# Patient Record
Sex: FEMALE | Race: BLACK OR AFRICAN AMERICAN | NOT HISPANIC OR LATINO
[De-identification: names, ages, dates, MRNs, and addresses within clinical notes are randomized per-mention and may not be internally consistent; named-entity substitution may affect disease eponyms.]

---

## 2017-08-17 ENCOUNTER — RESULT REVIEW (OUTPATIENT)
Age: 62
End: 2017-08-17

## 2019-10-31 ENCOUNTER — EMERGENCY (EMERGENCY)
Facility: HOSPITAL | Age: 64
LOS: 1 days | Discharge: HOME | End: 2019-10-31
Attending: STUDENT IN AN ORGANIZED HEALTH CARE EDUCATION/TRAINING PROGRAM | Admitting: STUDENT IN AN ORGANIZED HEALTH CARE EDUCATION/TRAINING PROGRAM
Payer: MEDICARE

## 2019-10-31 VITALS
DIASTOLIC BLOOD PRESSURE: 65 MMHG | TEMPERATURE: 98 F | RESPIRATION RATE: 16 BRPM | HEART RATE: 73 BPM | SYSTOLIC BLOOD PRESSURE: 141 MMHG | OXYGEN SATURATION: 99 %

## 2019-10-31 PROCEDURE — 73502 X-RAY EXAM HIP UNI 2-3 VIEWS: CPT | Mod: 26,LT

## 2019-10-31 PROCEDURE — 99283 EMERGENCY DEPT VISIT LOW MDM: CPT

## 2019-10-31 RX ORDER — ACETAMINOPHEN 500 MG
975 TABLET ORAL ONCE
Refills: 0 | Status: COMPLETED | OUTPATIENT
Start: 2019-10-31 | End: 2019-10-31

## 2019-10-31 RX ORDER — IBUPROFEN 200 MG
400 TABLET ORAL ONCE
Refills: 0 | Status: COMPLETED | OUTPATIENT
Start: 2019-10-31 | End: 2019-10-31

## 2019-10-31 RX ADMIN — Medication 400 MILLIGRAM(S): at 11:27

## 2019-10-31 RX ADMIN — Medication 975 MILLIGRAM(S): at 11:27

## 2019-10-31 NOTE — ED PROVIDER NOTE - PHYSICAL EXAMINATION
Msk: Limited ROM at L hip secondary to pain, L knee with full ROM , 5.5 strength of LLE, equal DP pulses, no swelling or external signs of trauma. Point tenderness to outer L hip. Msk: Limited ROM at L hip secondary to pain, L knee with full ROM , 5/5 strength of LLE, equal DP pulses, no swelling or external signs of trauma. Point tenderness to outer L hip.

## 2019-10-31 NOTE — ED PROVIDER NOTE - PATIENT PORTAL LINK FT
You can access the FollowMyHealth Patient Portal offered by [facility] by registering at the following website: [email or web address]. By joining [employer]’s FollowMyHealth portal, you will also be able to view your health information using other applications (apps) compatible with our system.

## 2019-10-31 NOTE — ED PROVIDER NOTE - CLINICAL SUMMARY MEDICAL DECISION MAKING FREE TEXT BOX
65 y/o F presents to ED with L hip and L leg pain after exercising progressively worsening. On physical exam pt has point tenderness to outer L hip. Plan for pain control, x-ray, and reassess.

## 2019-10-31 NOTE — ED PROVIDER NOTE - PROGRESS NOTE DETAILS
Pt is feeling better.  xrays with no acute findings.  will discharge with ortho follow up and insturctions for pain control at home.  - Ileana Obrien, DO

## 2019-10-31 NOTE — ED PROVIDER NOTE - OBJECTIVE STATEMENT
63 y/o F with PMHx HTN and HLD presents to ED with worsening sharp L leg pain since a week. Pt states at the onset of pain was exercising. Pain begins in L hip/buttocks and radiates down leg. Associated with these symptoms pt c/o numbness and difficulty bearing weight secondary to pain. Denies having any tingling, fever, recent falls/trauma, or other medical complaints. Advil taken with minimal relief of symptoms.

## 2019-10-31 NOTE — ED PROVIDER NOTE - NSFOLLOWUPINSTRUCTIONS_ED_ALL_ED_FT
- Take Motrin 400mg every 6 hrs as needed for pain. Take with food   - Take Tylenol 650mg every 6 hrs as needed for pain.  - You may purchase Lidocaine patches over the counter (Brand name is Salonpas) for pain relief.  Apply to 12 hours and remove for 12 hours.    - Follow up with your primary care physician within 2-3days for reevaluation.  - I have also provided numbers for orthopedic follow up - please call for an appointment.  - Return to the Emergency Department for worsening, progressive or any other concerning symptoms.

## 2019-10-31 NOTE — ED ADULT TRIAGE NOTE - CHIEF COMPLAINT QUOTE
Pt complaining of L pain radiating down her leg since Friday. Pt states it became worse over the past day. Pt denies chest pain, sob, n/v/d, fever or chills.

## 2022-01-27 PROBLEM — E78.5 HYPERLIPIDEMIA, UNSPECIFIED: Chronic | Status: ACTIVE | Noted: 2019-10-31

## 2022-01-27 PROBLEM — I10 ESSENTIAL (PRIMARY) HYPERTENSION: Chronic | Status: ACTIVE | Noted: 2019-10-31

## 2022-01-31 ENCOUNTER — APPOINTMENT (OUTPATIENT)
Dept: SURGERY | Facility: CLINIC | Age: 67
End: 2022-01-31
Payer: MEDICARE

## 2022-01-31 VITALS
SYSTOLIC BLOOD PRESSURE: 113 MMHG | HEIGHT: 65 IN | HEART RATE: 62 BPM | DIASTOLIC BLOOD PRESSURE: 66 MMHG | BODY MASS INDEX: 27.16 KG/M2 | WEIGHT: 163 LBS

## 2022-01-31 VITALS — TEMPERATURE: 96.9 F

## 2022-01-31 DIAGNOSIS — E07.9 DISORDER OF THYROID, UNSPECIFIED: ICD-10-CM

## 2022-01-31 DIAGNOSIS — I10 ESSENTIAL (PRIMARY) HYPERTENSION: ICD-10-CM

## 2022-01-31 DIAGNOSIS — Z80.3 FAMILY HISTORY OF MALIGNANT NEOPLASM OF BREAST: ICD-10-CM

## 2022-01-31 DIAGNOSIS — Z78.9 OTHER SPECIFIED HEALTH STATUS: ICD-10-CM

## 2022-01-31 DIAGNOSIS — Z63.4 DISAPPEARANCE AND DEATH OF FAMILY MEMBER: ICD-10-CM

## 2022-01-31 DIAGNOSIS — E78.00 PURE HYPERCHOLESTEROLEMIA, UNSPECIFIED: ICD-10-CM

## 2022-01-31 PROCEDURE — 99203 OFFICE O/P NEW LOW 30 MIN: CPT

## 2022-01-31 RX ORDER — SERTRALINE 25 MG/1
25 TABLET, FILM COATED ORAL
Qty: 90 | Refills: 0 | Status: ACTIVE | COMMUNITY
Start: 2021-08-11

## 2022-01-31 RX ORDER — LOSARTAN POTASSIUM 50 MG/1
50 TABLET, FILM COATED ORAL
Qty: 90 | Refills: 0 | Status: ACTIVE | COMMUNITY
Start: 2021-11-13

## 2022-01-31 RX ORDER — SIMVASTATIN 20 MG/1
20 TABLET, FILM COATED ORAL
Qty: 90 | Refills: 0 | Status: ACTIVE | COMMUNITY
Start: 2021-08-11

## 2022-01-31 RX ORDER — AMLODIPINE BESYLATE 10 MG/1
10 TABLET ORAL
Qty: 90 | Refills: 0 | Status: ACTIVE | COMMUNITY
Start: 2021-08-11

## 2022-01-31 SDOH — SOCIAL STABILITY - SOCIAL INSECURITY: DISSAPEARANCE AND DEATH OF FAMILY MEMBER: Z63.4

## 2022-01-31 NOTE — CONSULT LETTER
[Dear  ___] : Dear  [unfilled], [Consult Letter:] : I had the pleasure of evaluating your patient, [unfilled]. [Please see my note below.] : Please see my note below. [Consult Closing:] : Thank you very much for allowing me to participate in the care of this patient.  If you have any questions, please do not hesitate to contact me. [Sincerely,] : Sincerely, [FreeTextEntry3] : Aly Ribeiro MD, FACS

## 2022-01-31 NOTE — HISTORY OF PRESENT ILLNESS
[de-identified] : Patient is a 66 year -old female  who was referred by Dr. Elisha Lester with the chief complaint of having a Left   breast mass . She  denies any trauma and She has no nipple discharge. She  denies any fever, night sweats or loss of appetite.    There is  family history of breast carcinoma in her sister's son.    Menarche at age 12 -3 para-2 and her last menstrual period was when she was 55 yo. Patient is on no hormonal replacement therapy.   Patient  had a  BL mammogram and US  on . Mammo  was deemed a BIRADS 0 and US deemed  BIRADS4  and showed Asymmetry in the left breast .   she had a US guided left breast (x2)  on 2022. 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 ER/OK negative. HER2 is equivocal. patient reports excision of the right breast mass in . it was benign.

## 2022-01-31 NOTE — PHYSICAL EXAM
[Alert] : alert [Oriented to Person] : oriented to person [Oriented to Place] : oriented to place [Oriented to Time] : oriented to time [Anxious] : anxious [de-identified] : She  is alert, well-groomed, and in NAD\par   [de-identified] : anicteric.  Nasal mucosa pink, septum midline. Oral mucosa pink.  Tongue midline, Pharynx without exudates.\par   [de-identified] : Neck supple. Trachea midline. Thyroid isthmus barely palpable, lobes not felt.\par   [de-identified] :  symmetric breasts with no nipple or skin retraction.  right breast periareolar scar. On palpation of her breasts, there is a palpable mass in the left 1200 6 cm FN.      No palpable masses on the right. No palpable lymph nodes.  No palpable supraclavicular masses. Axillas are benign/have palpable nodes.

## 2022-01-31 NOTE — PLAN
[FreeTextEntry1] : Ms. SMITH  was told significance of findings, options, risks and benefits were explained.  All surgical options were discussed including non-surgical treatments.   patient has multicentric left breast CA and will most likely a left breast mastectomy.  she was advised to have MRI of the breast prior to preceding with surgical options and return after the test. \par Patient advised to seek immediate medical attention with any acute change in symptoms or with the development of any new or worsening symptoms.  Patient's questions and concerns addressed to patient's satisfaction, and patient verbalized an understanding of the information discussed.\par \par

## 2022-02-24 ENCOUNTER — APPOINTMENT (OUTPATIENT)
Dept: SURGERY | Facility: CLINIC | Age: 67
End: 2022-02-24
Payer: MEDICARE

## 2022-02-24 VITALS — TEMPERATURE: 98 F

## 2022-02-24 PROCEDURE — 99213 OFFICE O/P EST LOW 20 MIN: CPT

## 2022-02-24 NOTE — PHYSICAL EXAM
[Alert] : alert [Oriented to Person] : oriented to person [Oriented to Place] : oriented to place [Oriented to Time] : oriented to time [Anxious] : anxious [de-identified] : She  is alert, well-groomed, and in NAD\par   [de-identified] : anicteric.  Nasal mucosa pink, septum midline. Oral mucosa pink.  Tongue midline, Pharynx without exudates.\par   [de-identified] : Neck supple. Trachea midline. Thyroid isthmus barely palpable, lobes not felt.\par   [de-identified] :  symmetric breasts with no nipple or skin retraction.  right breast periareolar scar. On palpation of her breasts, there is a palpable mass in the left 1200 6 cm FN.      No palpable masses on the right. No palpable lymph nodes.  No palpable supraclavicular masses. Axillas are benign/have palpable nodes.

## 2022-02-24 NOTE — HISTORY OF PRESENT ILLNESS
[de-identified] : Patient is a 66 year -old female  who was referred by Dr. Elisha Lester with the chief complaint of having a Left   breast mass .  she was advised to have BL breast MRI. she is returning today to discuss the results. there has been a technical issue with the computer system  at [facility] as per their representative. Ms. SMITH denies any trauma and she has no nipple discharge. Patient denies any fever, night sweats or loss of appetite.  she is here today  asking for BL mastectomy. she does not want reconstruction. \par Pertinent History:  \par  There is  family history of breast carcinoma in her sister's son.    Menarche at age 12 -3 para-2 and her last menstrual period was when she was 57 yo. Patient is on no hormonal replacement therapy.   Patient  had a  BL mammogram and US  on . Mammo  was deemed a BIRADS 0 and US deemed  BIRADS4  and showed Asymmetry in the left breast .   she had a US guided left breast (x2)  on 2022. 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 ER/UT negative. HER2 is equivocal. patient reports excision of the right breast mass in . it was benign.

## 2022-02-24 NOTE — PLAN
[FreeTextEntry1] : Ms. SMITH  was told significance of findings, options, risks and benefits were explained.  Informed consent for modified radical mastectomy left breast with sentinel  lymph node biopsy and right simple mastectomy and potential risks, benefits and alternatives (surgical options were discussed including non-surgical options or the option of no surgery) to the planned surgery were discussed in depth.  All surgical options were discussed including non-surgical treatments.  She wishes to proceed with surgery.  We will plan for surgery on at the next available date, pending any required insurance pre-certification or pre-approval. She agrees to obtain any necessary pre-operative evaluations and testing prior to surgery.\par Patient advised to seek immediate medical attention with any acute change in symptoms or with the development of any new or worsening symptoms.  Patient's questions and concerns addressed to patient's satisfaction, and patient verbalized an understanding of the information discussed.\par \par

## 2022-03-03 ENCOUNTER — OUTPATIENT (OUTPATIENT)
Dept: OUTPATIENT SERVICES | Facility: HOSPITAL | Age: 67
LOS: 1 days | End: 2022-03-03
Payer: MEDICARE

## 2022-03-03 VITALS
SYSTOLIC BLOOD PRESSURE: 132 MMHG | DIASTOLIC BLOOD PRESSURE: 70 MMHG | OXYGEN SATURATION: 99 % | RESPIRATION RATE: 16 BRPM | HEART RATE: 62 BPM | HEIGHT: 65 IN | TEMPERATURE: 98 F | WEIGHT: 154.98 LBS

## 2022-03-03 DIAGNOSIS — I10 ESSENTIAL (PRIMARY) HYPERTENSION: ICD-10-CM

## 2022-03-03 DIAGNOSIS — Z98.891 HISTORY OF UTERINE SCAR FROM PREVIOUS SURGERY: Chronic | ICD-10-CM

## 2022-03-03 DIAGNOSIS — C50.912 MALIGNANT NEOPLASM OF UNSPECIFIED SITE OF LEFT FEMALE BREAST: ICD-10-CM

## 2022-03-03 DIAGNOSIS — G47.33 OBSTRUCTIVE SLEEP APNEA (ADULT) (PEDIATRIC): ICD-10-CM

## 2022-03-03 DIAGNOSIS — Z01.818 ENCOUNTER FOR OTHER PREPROCEDURAL EXAMINATION: ICD-10-CM

## 2022-03-03 DIAGNOSIS — Z98.890 OTHER SPECIFIED POSTPROCEDURAL STATES: Chronic | ICD-10-CM

## 2022-03-03 LAB
ALBUMIN SERPL ELPH-MCNC: 4.2 G/DL — SIGNIFICANT CHANGE UP (ref 3.5–5)
ALP SERPL-CCNC: 81 U/L — SIGNIFICANT CHANGE UP (ref 40–120)
ALT FLD-CCNC: 22 U/L DA — SIGNIFICANT CHANGE UP (ref 10–60)
ANION GAP SERPL CALC-SCNC: 1 MMOL/L — LOW (ref 5–17)
APTT BLD: 36.8 SEC — HIGH (ref 27.5–35.5)
AST SERPL-CCNC: 22 U/L — SIGNIFICANT CHANGE UP (ref 10–40)
BASOPHILS # BLD AUTO: 0.03 K/UL — SIGNIFICANT CHANGE UP (ref 0–0.2)
BASOPHILS NFR BLD AUTO: 0.5 % — SIGNIFICANT CHANGE UP (ref 0–2)
BILIRUB SERPL-MCNC: 0.3 MG/DL — SIGNIFICANT CHANGE UP (ref 0.2–1.2)
BLD GP AB SCN SERPL QL: SIGNIFICANT CHANGE UP
BUN SERPL-MCNC: 17 MG/DL — SIGNIFICANT CHANGE UP (ref 7–18)
CALCIUM SERPL-MCNC: 10 MG/DL — SIGNIFICANT CHANGE UP (ref 8.4–10.5)
CHLORIDE SERPL-SCNC: 110 MMOL/L — HIGH (ref 96–108)
CO2 SERPL-SCNC: 27 MMOL/L — SIGNIFICANT CHANGE UP (ref 22–31)
CREAT SERPL-MCNC: 1.03 MG/DL — SIGNIFICANT CHANGE UP (ref 0.5–1.3)
EGFR: 59 ML/MIN/1.73M2 — LOW
EOSINOPHIL # BLD AUTO: 0.07 K/UL — SIGNIFICANT CHANGE UP (ref 0–0.5)
EOSINOPHIL NFR BLD AUTO: 1.3 % — SIGNIFICANT CHANGE UP (ref 0–6)
GLUCOSE SERPL-MCNC: 105 MG/DL — HIGH (ref 70–99)
HCT VFR BLD CALC: 37.5 % — SIGNIFICANT CHANGE UP (ref 34.5–45)
HGB BLD-MCNC: 12.1 G/DL — SIGNIFICANT CHANGE UP (ref 11.5–15.5)
IMM GRANULOCYTES NFR BLD AUTO: 0.2 % — SIGNIFICANT CHANGE UP (ref 0–1.5)
INR BLD: 1.01 RATIO — SIGNIFICANT CHANGE UP (ref 0.88–1.16)
LYMPHOCYTES # BLD AUTO: 3.36 K/UL — HIGH (ref 1–3.3)
LYMPHOCYTES # BLD AUTO: 60 % — HIGH (ref 13–44)
MCHC RBC-ENTMCNC: 29.4 PG — SIGNIFICANT CHANGE UP (ref 27–34)
MCHC RBC-ENTMCNC: 32.3 GM/DL — SIGNIFICANT CHANGE UP (ref 32–36)
MCV RBC AUTO: 91.2 FL — SIGNIFICANT CHANGE UP (ref 80–100)
MONOCYTES # BLD AUTO: 0.44 K/UL — SIGNIFICANT CHANGE UP (ref 0–0.9)
MONOCYTES NFR BLD AUTO: 7.9 % — SIGNIFICANT CHANGE UP (ref 2–14)
NEUTROPHILS # BLD AUTO: 1.69 K/UL — LOW (ref 1.8–7.4)
NEUTROPHILS NFR BLD AUTO: 30.1 % — LOW (ref 43–77)
NRBC # BLD: 0 /100 WBCS — SIGNIFICANT CHANGE UP (ref 0–0)
PLATELET # BLD AUTO: 276 K/UL — SIGNIFICANT CHANGE UP (ref 150–400)
POTASSIUM SERPL-MCNC: 4.5 MMOL/L — SIGNIFICANT CHANGE UP (ref 3.5–5.3)
POTASSIUM SERPL-SCNC: 4.5 MMOL/L — SIGNIFICANT CHANGE UP (ref 3.5–5.3)
PROT SERPL-MCNC: 7.7 G/DL — SIGNIFICANT CHANGE UP (ref 6–8.3)
PROTHROM AB SERPL-ACNC: 12 SEC — SIGNIFICANT CHANGE UP (ref 10.5–13.4)
RBC # BLD: 4.11 M/UL — SIGNIFICANT CHANGE UP (ref 3.8–5.2)
RBC # FLD: 12.3 % — SIGNIFICANT CHANGE UP (ref 10.3–14.5)
SODIUM SERPL-SCNC: 138 MMOL/L — SIGNIFICANT CHANGE UP (ref 135–145)
WBC # BLD: 5.6 K/UL — SIGNIFICANT CHANGE UP (ref 3.8–10.5)
WBC # FLD AUTO: 5.6 K/UL — SIGNIFICANT CHANGE UP (ref 3.8–10.5)

## 2022-03-03 PROCEDURE — 93005 ELECTROCARDIOGRAM TRACING: CPT

## 2022-03-03 PROCEDURE — 71046 X-RAY EXAM CHEST 2 VIEWS: CPT

## 2022-03-03 PROCEDURE — 93010 ELECTROCARDIOGRAM REPORT: CPT

## 2022-03-03 PROCEDURE — 71046 X-RAY EXAM CHEST 2 VIEWS: CPT | Mod: 26

## 2022-03-03 PROCEDURE — G0463: CPT

## 2022-03-03 NOTE — H&P PST ADULT - PROBLEM SELECTOR PLAN 2
Current treatment regimen - Amlodipine 10mg and Losartan 50mg daily. Advised to c/w regimen  Patient instructed with understanding verbalized to take Amlodipine and Losartan with a sip of water the day of surgery.  Follow up with PCP postoperatively.

## 2022-03-03 NOTE — H&P PST ADULT - PROBLEM SELECTOR PLAN 3
Patient with hx of RYAN on CPAP (non complaint). Patient states "I have it somewhere in my house, may be in the attic. I don't think I have it anymore"  Recommend maintaining perioperative RYAN precautions

## 2022-03-03 NOTE — H&P PST ADULT - PROBLEM SELECTOR PLAN 1
Patient scheduled for left breast modified radical mastectomy with sentinel lymph node biopsy and right breast mastectomy on 3/9/2022  Written and oral preoperative instructions given to patient with understanding verbalized.   Instructions given to include using 4% chlorhexidine gluconate pre procedural wash day of surgery prior to hospital arrival  Maintaining NPO status post midnight day before surgery  Stopping aspirin, NSAIDs, herbs, vitamins 7days before surgery   Patient is to expect a phone call day before surgery between the hours of 430- 630pm giving arrival time for surgery day.    Preoperative labs drawn (CBC, CMP, PT, PTT, INR, TYPE/SCREEN). Results pending   CXR ordered to be done today. Results pending   EKG done today - sinus bradycardia with 1st degree AV block, otherwise normal ECG. Copy given to patient for PMD's medical preoperative optimization

## 2022-03-03 NOTE — H&P PST ADULT - HISTORY OF PRESENT ILLNESS
67year old female with pmhx of RYAN on CPAP (non compliant), hypertension, hyperlipidemia and depression presents with c/o abnormal mammogram revealing lump confirmed by biopsy to be malignant. Patient is here today for presurgical testing for scheduled left breast modified radical mastectomy with sentinel lymph node biopsy and right breast mastectomy on 3/9/2022

## 2022-03-03 NOTE — H&P PST ADULT - NEGATIVE ENMT SYMPTOMS
no hearing difficulty/no nasal congestion/no nasal discharge/no dry mouth/no throat pain/no dysphagia

## 2022-03-03 NOTE — H&P PST ADULT - RESPIRATORY AND THORAX COMMENTS
Hx RYAN on CPAP (non compliant) states "I have it somewhere in my house, I don't think I have it anymore"

## 2022-03-03 NOTE — H&P PST ADULT - NSICDXPASTMEDICALHX_GEN_ALL_CORE_FT
PAST MEDICAL HISTORY:  Depression, major     HLD (hyperlipidemia)     HTN (hypertension)     RYAN on CPAP Non compliant

## 2022-03-08 ENCOUNTER — TRANSCRIPTION ENCOUNTER (OUTPATIENT)
Age: 67
End: 2022-03-08

## 2022-03-09 ENCOUNTER — INPATIENT (INPATIENT)
Facility: HOSPITAL | Age: 67
LOS: 0 days | Discharge: HOME HEALTH | DRG: 581 | End: 2022-03-10
Attending: SPECIALIST | Admitting: SPECIALIST
Payer: MEDICARE

## 2022-03-09 ENCOUNTER — RESULT REVIEW (OUTPATIENT)
Age: 67
End: 2022-03-09

## 2022-03-09 ENCOUNTER — APPOINTMENT (OUTPATIENT)
Dept: SURGERY | Facility: HOSPITAL | Age: 67
End: 2022-03-09
Payer: MEDICARE

## 2022-03-09 VITALS
RESPIRATION RATE: 16 BRPM | DIASTOLIC BLOOD PRESSURE: 59 MMHG | WEIGHT: 154.98 LBS | HEIGHT: 65 IN | SYSTOLIC BLOOD PRESSURE: 123 MMHG | HEART RATE: 73 BPM | TEMPERATURE: 99 F | OXYGEN SATURATION: 99 %

## 2022-03-09 DIAGNOSIS — Z01.818 ENCOUNTER FOR OTHER PREPROCEDURAL EXAMINATION: ICD-10-CM

## 2022-03-09 DIAGNOSIS — C50.912 MALIGNANT NEOPLASM OF UNSPECIFIED SITE OF LEFT FEMALE BREAST: ICD-10-CM

## 2022-03-09 DIAGNOSIS — Z98.891 HISTORY OF UTERINE SCAR FROM PREVIOUS SURGERY: Chronic | ICD-10-CM

## 2022-03-09 DIAGNOSIS — Z98.890 OTHER SPECIFIED POSTPROCEDURAL STATES: Chronic | ICD-10-CM

## 2022-03-09 LAB — BLD GP AB SCN SERPL QL: SIGNIFICANT CHANGE UP

## 2022-03-09 PROCEDURE — 19307 MAST MOD RAD: CPT | Mod: LT

## 2022-03-09 PROCEDURE — 88309 TISSUE EXAM BY PATHOLOGIST: CPT | Mod: 26

## 2022-03-09 PROCEDURE — 78195 LYMPH SYSTEM IMAGING: CPT | Mod: 26,MG

## 2022-03-09 PROCEDURE — 19303 MAST SIMPLE COMPLETE: CPT | Mod: RT

## 2022-03-09 PROCEDURE — 88333 PATH CONSLTJ SURG CYTO XM 1: CPT | Mod: 26

## 2022-03-09 PROCEDURE — 88342 IMHCHEM/IMCYTCHM 1ST ANTB: CPT | Mod: 26

## 2022-03-09 PROCEDURE — G1004: CPT

## 2022-03-09 PROCEDURE — 88305 TISSUE EXAM BY PATHOLOGIST: CPT | Mod: 26

## 2022-03-09 RX ORDER — AMLODIPINE BESYLATE 2.5 MG/1
10 TABLET ORAL DAILY
Refills: 0 | Status: DISCONTINUED | OUTPATIENT
Start: 2022-03-09 | End: 2022-03-10

## 2022-03-09 RX ORDER — LOSARTAN POTASSIUM 100 MG/1
1 TABLET, FILM COATED ORAL
Qty: 0 | Refills: 0 | DISCHARGE

## 2022-03-09 RX ORDER — SERTRALINE 25 MG/1
1 TABLET, FILM COATED ORAL
Qty: 0 | Refills: 0 | DISCHARGE

## 2022-03-09 RX ORDER — MORPHINE SULFATE 50 MG/1
2 CAPSULE, EXTENDED RELEASE ORAL EVERY 4 HOURS
Refills: 0 | Status: DISCONTINUED | OUTPATIENT
Start: 2022-03-09 | End: 2022-03-10

## 2022-03-09 RX ORDER — SIMVASTATIN 20 MG/1
20 TABLET, FILM COATED ORAL AT BEDTIME
Refills: 0 | Status: DISCONTINUED | OUTPATIENT
Start: 2022-03-09 | End: 2022-03-10

## 2022-03-09 RX ORDER — LOSARTAN POTASSIUM 100 MG/1
50 TABLET, FILM COATED ORAL DAILY
Refills: 0 | Status: DISCONTINUED | OUTPATIENT
Start: 2022-03-09 | End: 2022-03-10

## 2022-03-09 RX ORDER — HEPARIN SODIUM 5000 [USP'U]/ML
5000 INJECTION INTRAVENOUS; SUBCUTANEOUS EVERY 8 HOURS
Refills: 0 | Status: DISCONTINUED | OUTPATIENT
Start: 2022-03-10 | End: 2022-03-10

## 2022-03-09 RX ORDER — AMLODIPINE BESYLATE 2.5 MG/1
1 TABLET ORAL
Qty: 0 | Refills: 0 | DISCHARGE

## 2022-03-09 RX ORDER — FENTANYL CITRATE 50 UG/ML
25 INJECTION INTRAVENOUS
Refills: 0 | Status: DISCONTINUED | OUTPATIENT
Start: 2022-03-09 | End: 2022-03-09

## 2022-03-09 RX ORDER — SODIUM CHLORIDE 9 MG/ML
3 INJECTION INTRAMUSCULAR; INTRAVENOUS; SUBCUTANEOUS EVERY 8 HOURS
Refills: 0 | Status: DISCONTINUED | OUTPATIENT
Start: 2022-03-09 | End: 2022-03-09

## 2022-03-09 RX ORDER — SIMVASTATIN 20 MG/1
1 TABLET, FILM COATED ORAL
Qty: 0 | Refills: 0 | DISCHARGE

## 2022-03-09 RX ORDER — SERTRALINE 25 MG/1
25 TABLET, FILM COATED ORAL DAILY
Refills: 0 | Status: DISCONTINUED | OUTPATIENT
Start: 2022-03-09 | End: 2022-03-10

## 2022-03-09 RX ADMIN — SIMVASTATIN 20 MILLIGRAM(S): 20 TABLET, FILM COATED ORAL at 21:23

## 2022-03-09 NOTE — ASU PATIENT PROFILE, ADULT - FALL HARM RISK - UNIVERSAL INTERVENTIONS
Bed in lowest position, wheels locked, appropriate side rails in place/Call bell, personal items and telephone in reach/Instruct patient to call for assistance before getting out of bed or chair/Non-slip footwear when patient is out of bed/[place] to call system/Physically safe environment - no spills, clutter or unnecessary equipment/Purposeful Proactive Rounding/Room/bathroom lighting operational, light cord in reach

## 2022-03-09 NOTE — BRIEF OPERATIVE NOTE - NSICDXBRIEFPROCEDURE_GEN_ALL_CORE_FT
PROCEDURES:  Right simple mastectomy 09-Mar-2022 11:46:44  Brooks Barboza  Modified radical mastectomy of left breast with sentinel node biopsy 09-Mar-2022 11:47:09  Brooks Barboza

## 2022-03-10 ENCOUNTER — TRANSCRIPTION ENCOUNTER (OUTPATIENT)
Age: 67
End: 2022-03-10

## 2022-03-10 VITALS
SYSTOLIC BLOOD PRESSURE: 111 MMHG | OXYGEN SATURATION: 98 % | DIASTOLIC BLOOD PRESSURE: 54 MMHG | RESPIRATION RATE: 18 BRPM | HEART RATE: 987 BPM

## 2022-03-10 LAB
ANION GAP SERPL CALC-SCNC: 2 MMOL/L — LOW (ref 5–17)
BUN SERPL-MCNC: 14 MG/DL — SIGNIFICANT CHANGE UP (ref 7–18)
CALCIUM SERPL-MCNC: 9.9 MG/DL — SIGNIFICANT CHANGE UP (ref 8.4–10.5)
CHLORIDE SERPL-SCNC: 107 MMOL/L — SIGNIFICANT CHANGE UP (ref 96–108)
CO2 SERPL-SCNC: 28 MMOL/L — SIGNIFICANT CHANGE UP (ref 22–31)
CREAT SERPL-MCNC: 0.91 MG/DL — SIGNIFICANT CHANGE UP (ref 0.5–1.3)
EGFR: 68 ML/MIN/1.73M2 — SIGNIFICANT CHANGE UP
GLUCOSE SERPL-MCNC: 109 MG/DL — HIGH (ref 70–99)
HCT VFR BLD CALC: 32 % — LOW (ref 34.5–45)
HGB BLD-MCNC: 10.9 G/DL — LOW (ref 11.5–15.5)
MCHC RBC-ENTMCNC: 30.5 PG — SIGNIFICANT CHANGE UP (ref 27–34)
MCHC RBC-ENTMCNC: 34.1 GM/DL — SIGNIFICANT CHANGE UP (ref 32–36)
MCV RBC AUTO: 89.6 FL — SIGNIFICANT CHANGE UP (ref 80–100)
NRBC # BLD: 0 /100 WBCS — SIGNIFICANT CHANGE UP (ref 0–0)
PLATELET # BLD AUTO: 211 K/UL — SIGNIFICANT CHANGE UP (ref 150–400)
POTASSIUM SERPL-MCNC: 4.1 MMOL/L — SIGNIFICANT CHANGE UP (ref 3.5–5.3)
POTASSIUM SERPL-SCNC: 4.1 MMOL/L — SIGNIFICANT CHANGE UP (ref 3.5–5.3)
RBC # BLD: 3.57 M/UL — LOW (ref 3.8–5.2)
RBC # FLD: 12.4 % — SIGNIFICANT CHANGE UP (ref 10.3–14.5)
SODIUM SERPL-SCNC: 137 MMOL/L — SIGNIFICANT CHANGE UP (ref 135–145)
WBC # BLD: 9.48 K/UL — SIGNIFICANT CHANGE UP (ref 3.8–10.5)
WBC # FLD AUTO: 9.48 K/UL — SIGNIFICANT CHANGE UP (ref 3.8–10.5)

## 2022-03-10 PROCEDURE — 36415 COLL VENOUS BLD VENIPUNCTURE: CPT

## 2022-03-10 PROCEDURE — 78195 LYMPH SYSTEM IMAGING: CPT | Mod: MG

## 2022-03-10 PROCEDURE — 86850 RBC ANTIBODY SCREEN: CPT

## 2022-03-10 PROCEDURE — 88342 IMHCHEM/IMCYTCHM 1ST ANTB: CPT

## 2022-03-10 PROCEDURE — 80048 BASIC METABOLIC PNL TOTAL CA: CPT

## 2022-03-10 PROCEDURE — A9541: CPT

## 2022-03-10 PROCEDURE — G1004: CPT

## 2022-03-10 PROCEDURE — 85027 COMPLETE CBC AUTOMATED: CPT

## 2022-03-10 PROCEDURE — 86901 BLOOD TYPING SEROLOGIC RH(D): CPT

## 2022-03-10 PROCEDURE — 88305 TISSUE EXAM BY PATHOLOGIST: CPT

## 2022-03-10 PROCEDURE — 86923 COMPATIBILITY TEST ELECTRIC: CPT

## 2022-03-10 PROCEDURE — 88333 PATH CONSLTJ SURG CYTO XM 1: CPT

## 2022-03-10 PROCEDURE — 86900 BLOOD TYPING SEROLOGIC ABO: CPT

## 2022-03-10 PROCEDURE — 88309 TISSUE EXAM BY PATHOLOGIST: CPT

## 2022-03-10 RX ORDER — OXYCODONE HYDROCHLORIDE 5 MG/1
1 TABLET ORAL
Qty: 6 | Refills: 0
Start: 2022-03-10

## 2022-03-10 RX ADMIN — HEPARIN SODIUM 5000 UNIT(S): 5000 INJECTION INTRAVENOUS; SUBCUTANEOUS at 06:11

## 2022-03-10 RX ADMIN — SERTRALINE 25 MILLIGRAM(S): 25 TABLET, FILM COATED ORAL at 12:48

## 2022-03-10 NOTE — PROGRESS NOTE ADULT - SUBJECTIVE AND OBJECTIVE BOX
Post op  Pt s- complaints of pain, no cp no sob  ICU Vital Signs Last 24 Hrs  T(C): 36.5 (09 Mar 2022 15:21), Max: 37 (09 Mar 2022 08:43)  T(F): 97.7 (09 Mar 2022 15:21), Max: 98.6 (09 Mar 2022 08:43)  HR: 69 (09 Mar 2022 15:21) (69 - 88)  BP: 118/64 (09 Mar 2022 15:21) (113/53 - 124/55)  BP(mean): 70 (09 Mar 2022 14:34) (67 - 88)  ABP: --  ABP(mean): --  RR: 18 (09 Mar 2022 15:21) (13 - 19)  SpO2: 100% (09 Mar 2022 15:21) (94% - 100%)  Alert nad  Abd: soft nt nd chest wall wound: minimal dried blood on dressign laterally. no hematome, no active bleed evident.  JPRight 20cc serosanguinous  RYAN Left 1 25cc serosanguinous   RYAN 2 5cc serosanguinous  no calf swelling or erythema  
INTERVAL HPI/OVERNIGHT EVENTS:  Pt stable.   Tolerating diet.   Minimal incisional pain.    Vital Signs Last 24 Hrs  T(C): 36.9 (10 Mar 2022 06:02), Max: 37.3 (10 Mar 2022 00:00)  T(F): 98.5 (10 Mar 2022 06:02), Max: 99.1 (10 Mar 2022 00:00)  HR: 67 (10 Mar 2022 06:02) (67 - 88)  BP: 114/64 (10 Mar 2022 06:02) (113/53 - 125/57)  BP(mean): 71 (09 Mar 2022 20:23) (67 - 88)  RR: 17 (10 Mar 2022 06:02) (13 - 19)  SpO2: 98% (10 Mar 2022 06:02) (94% - 100%)    Physical:  Dressings intact, German drains serosanguinous drainage.  Abdomen: Soft nondistended, nontender.    I&O's Summary    09 Mar 2022 07:01  -  10 Mar 2022 07:00  --------------------------------------------------------  IN: 950 mL / OUT: 472 mL / NET: 478 mL        LABS:                        10.9   9.48  )-----------( 211      ( 10 Mar 2022 05:44 )             32.0             03-10    137  |  107  |  14  ----------------------------<  109<H>  4.1   |  28  |  0.91    Ca    9.9      10 Mar 2022 05:44    
S/p bilateral mastectomy, left sentinel lymph node POD#1  Patient seen and examined at bedside   Admits to mild pain.  Denies nausea and vomiting  tolerating diet    Vital Signs Last 24 Hrs  T(F): 98.5 (03-10-22 @ 06:02), Max: 99.1 (03-10-22 @ 00:00)  HR: 67 (03-10-22 @ 06:02)  BP: 114/64 (03-10-22 @ 06:02)  RR: 17 (03-10-22 @ 06:02)  SpO2: 98% (03-10-22 @ 06:02)    GENERAL: Alert, NAD  CHEST/LUNG: respirations nonlabored; postoperative bra in place, some blood staining to lateral dressing under bra near drain site. Drains x3. right drain: 70ml output since placed, left drain output: 65ml and 37ml since placed     I&O's Detail    09 Mar 2022 07:01  -  10 Mar 2022 07:00  --------------------------------------------------------  IN:    Lactated Ringers Bolus: 950 mL  Total IN: 950 mL    OUT:    Bulb (mL): 37 mL    Bulb (mL): 65 mL    Bulb (mL): 70 mL    Voided (mL): 300 mL  Total OUT: 472 mL    Total NET: 478 mL    LABS:                        10.9   9.48  )-----------( 211      ( 10 Mar 2022 05:44 )             32.0     03-10    137  |  107  |  14  ----------------------------<  109<H>  4.1   |  28  |  0.91    Ca    9.9      10 Mar 2022 05:44

## 2022-03-10 NOTE — DISCHARGE NOTE NURSING/CASE MANAGEMENT/SOCIAL WORK - PATIENT PORTAL LINK FT
You can access the FollowMyHealth Patient Portal offered by [facility] by registering at the following website: [email or web address]. By joining [employer]’s FollowMyHealth portal, you will also be able to view your health information using other applications (apps) compatible with our system.

## 2022-03-10 NOTE — PROGRESS NOTE ADULT - ASSESSMENT
67 year old female S/p bilateral mastectomy, left sentinel lymph node POD#1, labs and vitals stable    - drain teaching prior to discharge  - regular diet  - discharge planning to home  - follow up with Dr. Ribeiro in one week for drain removal  - discussed with Dr. Ribeiro   
D/C planning.
s/p Right simple mastectemy  s/p left MRM and sentinal LN bx  post op stable    oob  diet as tolerated  analgesia prn  d/c planning in am

## 2022-03-10 NOTE — DISCHARGE NOTE PROVIDER - HOSPITAL COURSE
67 year old female with pmhx of RYAN on CPAP (non compliant), hypertension, hyperlipidemia and depression presents with c/o abnormal mammogram revealing lump confirmed by biopsy to be malignant. Patient is here today for elective, scheduled left breast modified radical mastectomy with sentinel lymph node biopsy and right breast mastectomy on 3/9/2022. Surgery was uncomplicated and she was admitted postoperatively for monitoring. 3 drains were left in place and output was followed. Labs and vital signs remained stable and she was cleared for discharge.

## 2022-03-10 NOTE — DISCHARGE NOTE PROVIDER - CARE PROVIDER_API CALL
Aly Ribeiro)  Surgery  [address], [place] [address]  Phone: [phone]  Fax: [phone]  Follow Up Time: 2 weeks

## 2022-03-10 NOTE — DISCHARGE NOTE PROVIDER - NSDCMRMEDTOKEN_GEN_ALL_CORE_FT
amLODIPine 10 mg oral tablet: 1 tab(s) orally once a day  losartan 50 mg oral tablet: 1 tab(s) orally once a day  oxyCODONE 5 mg oral tablet: 1 tab(s) orally every 6 hours, As Needed -for agitation - for severe pain MDD:4  sertraline 25 mg oral tablet: 1 tab(s) orally once a day  simvastatin 20 mg oral tablet: 1 tab(s) orally once a day (at bedtime)

## 2022-03-10 NOTE — DISCHARGE NOTE PROVIDER - NSDCCPCAREPLAN_GEN_ALL_CORE_FT
PRINCIPAL DISCHARGE DIAGNOSIS  Diagnosis: S/P bilateral mastectomy  Assessment and Plan of Treatment: Please follow-up with your surgeon in 1 week. Drink plenty of fluids and rest as needed. Call for any fever over 101, nausea, vomiting, severe pain, no passing of gas or bowel movement.   DIET  You may resume your regular diet as normal.   SURGICAL SITES  ACTIVITY  Do not lift anything heavier than 10 pounds for 2 weeks and avoid strenuous activity for 4-6 weeks.   PAIN CONTROL  You may take Motrin 600mg (with food) or Tylenol 650mg as needed for mild pain. Stagger one medication 3 hours after the other for maximum pain control. Maximum daily dose of Tylenol should not exceed 4grams/day.   You may take your prescribed oxycodone for severe breakthrough pain not that is not relieved by Tylenol/Motrin. Do not drive or make important decisions while taking this medication and do not take more than 4 pills in 24 hours.       PRINCIPAL DISCHARGE DIAGNOSIS  Diagnosis: S/P bilateral mastectomy  Assessment and Plan of Treatment: Please follow-up with your surgeon in 1 week. Drink plenty of fluids and rest as needed. Call for any fever over 101, nausea, vomiting, severe pain, no passing of gas or bowel movement.   DIET  You may resume your regular diet as normal.   SURGICAL SITES  Keep surgical bra on. Keep dressing dry. Sponge bath only. Empty RYAN drains daily, recording amount each time.  ACTIVITY  Do not lift anything heavier than 10 pounds for 2 weeks and avoid strenuous activity for 4-6 weeks.   PAIN CONTROL  You may take Motrin 600mg (with food) or Tylenol 650mg as needed for mild pain. Stagger one medication 3 hours after the other for maximum pain control. Maximum daily dose of Tylenol should not exceed 4grams/day.   You may take your prescribed oxycodone for severe breakthrough pain not that is not relieved by Tylenol/Motrin. Do not drive or make important decisions while taking this medication and do not take more than 4 pills in 24 hours.

## 2022-03-10 NOTE — DISCHARGE NOTE NURSING/CASE MANAGEMENT/SOCIAL WORK - NSDCPEFALRISK_GEN_ALL_CORE
For information on Fall & Injury Prevention, visit: [email or web address] OR  [email or web address] OR  https://www.cdc.gov/steadi/patient.html

## 2022-03-10 NOTE — DISCHARGE NOTE PROVIDER - NSDCCPTREATMENT_GEN_ALL_CORE_FT
PRINCIPAL PROCEDURE  Procedure: Modified radical mastectomy of left breast with sentinel node biopsy  Findings and Treatment:       SECONDARY PROCEDURE  Procedure: Right simple mastectomy  Findings and Treatment:

## 2022-03-14 ENCOUNTER — APPOINTMENT (OUTPATIENT)
Dept: SURGERY | Facility: CLINIC | Age: 67
End: 2022-03-14
Payer: MEDICARE

## 2022-03-14 VITALS — TEMPERATURE: 97.1 F

## 2022-03-14 PROBLEM — F32.9 MAJOR DEPRESSIVE DISORDER, SINGLE EPISODE, UNSPECIFIED: Chronic | Status: ACTIVE | Noted: 2022-03-03

## 2022-03-14 PROBLEM — G47.33 OBSTRUCTIVE SLEEP APNEA (ADULT) (PEDIATRIC): Chronic | Status: ACTIVE | Noted: 2022-03-03

## 2022-03-14 PROCEDURE — 99024 POSTOP FOLLOW-UP VISIT: CPT

## 2022-03-14 NOTE — ASSESSMENT
[FreeTextEntry1] : Ms. SMITH is doing well, with excellent post-operative recovery. All surgical incisions are healing well and as expected. There is no evidence of infection or complication, and she is progressing as expected. RYAN drains x3  safely removed. Post-operative wound care, activity, restrictions and precautions reinforced. Patient instructed to refrain from any heavy lifting greater than 10-15 pounds for at least 4-6 weeks post-operatively.  Patient's questions and concerns addressed to patient's satisfaction.\par

## 2022-03-14 NOTE — PHYSICAL EXAM
[Alert] : alert [Oriented to Person] : oriented to person [Oriented to Place] : oriented to place [Oriented to Time] : oriented to time [Calm] : calm [de-identified] : She  is alert, well-groomed, and in NAD\par   [de-identified] : anicteric.  Nasal mucosa pink, septum midline. Oral mucosa pink.  Tongue midline, Pharynx without exudates.\par   [de-identified] : right mastectomy  Surgical wound is healing well.   no signs of  inflammation or infection. RYAN drain x 1 with minimal serous output.  left mastectomy Surgical wound is healing well.   no signs of  inflammation or infection. RYAN drain x2 .

## 2022-03-14 NOTE — HISTORY OF PRESENT ILLNESS
[de-identified] : Ms. SMITH  is s/p right simple and left modified mastectomy and sentinel lymph node biopsy on 03/09/2022. Today Ms. SMITH offers no complaints. patient reports no fever, chills,  or  pain. Her  surgical wounds are  healing well. No signs of inflammation, infection or exudate.   paltient is here because one of the RYAN drains is leaking around the tube. Patient reports good bowel movements and appetite.

## 2022-03-14 NOTE — PLAN
[FreeTextEntry1] : patient will follow up in 2 weeks  or if needed. Warning signs, follow up, and restrictions were discussed with the patient.

## 2022-03-16 LAB — SURGICAL PATHOLOGY STUDY: SIGNIFICANT CHANGE UP

## 2022-03-17 ENCOUNTER — APPOINTMENT (OUTPATIENT)
Dept: SURGERY | Facility: CLINIC | Age: 67
End: 2022-03-17

## 2022-03-28 ENCOUNTER — APPOINTMENT (OUTPATIENT)
Dept: SURGERY | Facility: CLINIC | Age: 67
End: 2022-03-28
Payer: MEDICARE

## 2022-03-28 VITALS — TEMPERATURE: 97.2 F

## 2022-03-28 PROCEDURE — 99024 POSTOP FOLLOW-UP VISIT: CPT

## 2022-03-28 NOTE — PLAN
[FreeTextEntry1] : Consult oncology DR Maravilla\par patient will follow up in  6 weeks  or if needed. Warning signs, follow up, and restrictions were discussed with the patient.

## 2022-03-28 NOTE — HISTORY OF PRESENT ILLNESS
[de-identified] : Ms. SMITH is s/p right simple and left modified mastectomy and sentinel lymph node biopsy on 03/09/2022. Today Ms. SMITH offers no complaints. patient reports no fever, chills,  or  pain. Her  surgical wounds are  healing well. No signs of inflammation, infection or exudate. Patient reports good bowel movements and appetite.

## 2022-03-28 NOTE — ASSESSMENT
[FreeTextEntry1] : Ms. SMITH is doing well, with excellent post-operative recovery. All surgical incisions are healing well and as expected. There is no evidence of infection or complication, and she is progressing as expected. Post-operative wound care, activity, restrictions and precautions reinforced.  pathology results were discussed in details.   patient was advised to consult Dr. Maravilla. Patient's questions and concerns addressed to patient's satisfaction.

## 2022-03-28 NOTE — DATA REVIEWED
[FreeTextEntry1] : Patient:   DARLENE SMITH\par \par \par Accession:                             [id number]\par \par Collected Date/Time:                   3/9/2022 10:51 EST\par Received Date/Time:                    3/9/2022 10:55 EST\par \par Surgical Pathology Report - Auth (Verified)\par \par Specimen(s) Submitted\par 1-Left sentinel node\par 2-Right breast tissue\par 3 -Left breast tissue\par \par Final Diagnosis\par \par 1. Left sentinel node; excision:\par \par - One sentinel lymph node, negative for metastatic carcinoma on H and E\par stains, and confirmed by cytokeratin AE 1,3; pN0 (sn)(i-).\par \par 2. Right breast tissue; simple mastectomy:\par -Benign breast tissue harboring microcalcifications and showing stromal\par fibrosis, mild ductal epithelial hyperplasia, nodular adenosis and\par cystically dilated ducts.\par - Skin with areola and nipple with no significant histopathological\par findings.\par - One benign intramammary lymph node.\par \par 3. 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 [place]\par histologic grade 2 of 3, score 6 of 9.\par - The largest invasive focus measures 20 mm, mpT1c and is located\par centrally greater than 2.5 cm from the nearest posterior mastectomy\par margin.\par - Lymphovascular invasion is not identified.\par - Additional invasive foci show similar histopathological features and\par range in size from 8-10 mm.\par - Ductal carcinoma in situ, intermediate to high nuclear grade with\par comedo necrosis, solid, cribriform, micropapillary with apocrine features\par associated with invasive carcinoma and located greater than 2.5 cm from\par the nearest posterior mastectomy margin.\par - Thirteen axillary lymph nodes are negative for metastatic carcinoma,\par 0/13,  pN0.\par \par Verified by: Argelia Patrick MD\par (Electronic Signature)\par Reported on: 03/16/22 20:44 EDT, [address]\par Phone: [phone]   Fax: [phone]\par _________________________________________________________________\par

## 2022-03-28 NOTE — PHYSICAL EXAM
[Alert] : alert [Oriented to Person] : oriented to person [Oriented to Place] : oriented to place [Oriented to Time] : oriented to time [Calm] : calm [de-identified] : She  is alert, well-groomed, and in NAD\par   [de-identified] : right mastectomy  Surgical wound is healing well.   no signs of  inflammation or infection.  .  left mastectomy Surgical wound is healing well.   no signs of  inflammation or infection.   [de-identified] : anicteric.  Nasal mucosa pink, septum midline. Oral mucosa pink.  Tongue midline, Pharynx without exudates.\par

## 2022-05-09 ENCOUNTER — APPOINTMENT (OUTPATIENT)
Dept: SURGERY | Facility: CLINIC | Age: 67
End: 2022-05-09
Payer: MEDICARE

## 2022-05-09 VITALS — TEMPERATURE: 96.8 F

## 2022-05-09 PROCEDURE — 99024 POSTOP FOLLOW-UP VISIT: CPT

## 2022-05-09 NOTE — DATA REVIEWED
[FreeTextEntry1] : Patient:   DARLENE SMITH\par \par \par Accession:                             [id number]\par \par Collected Date/Time:                   3/9/2022 10:51 EST\par Received Date/Time:                    3/9/2022 10:55 EST\par \par Surgical Pathology Report - Auth (Verified)\par \par Specimen(s) Submitted\par 1-Left sentinel node\par 2-Right breast tissue\par 3 -Left breast tissue\par \par Final Diagnosis\par \par 1. Left sentinel node; excision:\par \par - One sentinel lymph node, negative for metastatic carcinoma on H and E\par stains, and confirmed by cytokeratin AE 1,3; pN0 (sn)(i-).\par \par 2. Right breast tissue; simple mastectomy:\par -Benign breast tissue harboring microcalcifications and showing stromal\par fibrosis, mild ductal epithelial hyperplasia, nodular adenosis and\par cystically dilated ducts.\par - Skin with areola and nipple with no significant histopathological\par findings.\par - One benign intramammary lymph node.\par \par 3. 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 [place]\par histologic grade 2 of 3, score 6 of 9.\par - The largest invasive focus measures 20 mm, mpT1c and is located\par centrally greater than 2.5 cm from the nearest posterior mastectomy\par margin.\par - Lymphovascular invasion is not identified.\par - Additional invasive foci show similar histopathological features and\par range in size from 8-10 mm.\par - Ductal carcinoma in situ, intermediate to high nuclear grade with\par comedo necrosis, solid, cribriform, micropapillary with apocrine features\par associated with invasive carcinoma and located greater than 2.5 cm from\par the nearest posterior mastectomy margin.\par - Thirteen axillary lymph nodes are negative for metastatic carcinoma,\par 0/13,  pN0.\par \par Verified by: Argelia Patrick MD\par (Electronic Signature)\par Reported on: 03/16/22 20:44 EDT, [address]\par Phone: [phone]   Fax: [phone]\par _________________________________________________________________\par \par Synoptic Summary

## 2022-05-09 NOTE — PHYSICAL EXAM
[Alert] : alert [Oriented to Person] : oriented to person [Oriented to Place] : oriented to place [Oriented to Time] : oriented to time [Calm] : calm [de-identified] : She  is alert, well-groomed, and in NAD\par   [de-identified] : anicteric.  Nasal mucosa pink, septum midline. Oral mucosa pink.  Tongue midline, Pharynx without exudates.\par   [de-identified] : right mastectomy  Surgical wound is healing well.   no signs of  inflammation or infection.  .  left mastectomy Surgical wound is healing well.   no signs of  inflammation or infection.

## 2022-05-09 NOTE — HISTORY OF PRESENT ILLNESS
[de-identified] : Ms. SMITH is s/p right simple and left modified mastectomy and sentinel lymph node biopsy on 03/09/2022. right mastectomy  pathology results were  consistent with benign tissue and left mastectomy  pathology results were  consistent with  Multifocal (at least 4 foci ranging from 8-20 mm) invasive moderately  differentiated ductal carcinoma , margins are clean.  Thirteen axillary lymph nodes are negative for metastatic carcinoma. Today  Ms. SMITH offers no complaints. patient reports no fever or  chills. patient reports occasional discomfort in the left surgical area.  Her surgical incisions are healing well. No signs of inflammation, infection or exudate. patient reports good bowel movements and appetite.  she is being followed by DR William (oncology) and is scheduled to get chemotherapy

## 2022-05-09 NOTE — ASSESSMENT
[FreeTextEntry1] : Ms. SMITH is doing well, with excellent post-operative recovery. All surgical incisions are healing well and as expected. There is no evidence of infection or complication, and she is progressing as expected. Post-operative wound care, activity, restrictions and precautions reinforced.   Patient's questions and concerns addressed to patient's satisfaction.\par

## 2022-05-09 NOTE — PLAN
[FreeTextEntry1] : patient will follow up in 3 months or if needed. Warning signs, follow up, and restrictions were discussed with the patient. \par \par follow up with DR William \par

## 2022-05-18 ENCOUNTER — EMERGENCY (EMERGENCY)
Facility: HOSPITAL | Age: 67
LOS: 1 days | Discharge: HOME | End: 2022-05-18
Attending: STUDENT IN AN ORGANIZED HEALTH CARE EDUCATION/TRAINING PROGRAM
Payer: MEDICARE

## 2022-05-18 VITALS
HEART RATE: 78 BPM | RESPIRATION RATE: 18 BRPM | TEMPERATURE: 98 F | OXYGEN SATURATION: 96 % | SYSTOLIC BLOOD PRESSURE: 116 MMHG | DIASTOLIC BLOOD PRESSURE: 66 MMHG

## 2022-05-18 VITALS
RESPIRATION RATE: 16 BRPM | HEIGHT: 65 IN | WEIGHT: 134.92 LBS | HEART RATE: 76 BPM | SYSTOLIC BLOOD PRESSURE: 114 MMHG | OXYGEN SATURATION: 98 % | TEMPERATURE: 99 F | DIASTOLIC BLOOD PRESSURE: 65 MMHG

## 2022-05-18 DIAGNOSIS — Z98.890 OTHER SPECIFIED POSTPROCEDURAL STATES: Chronic | ICD-10-CM

## 2022-05-18 DIAGNOSIS — Z98.891 HISTORY OF UTERINE SCAR FROM PREVIOUS SURGERY: Chronic | ICD-10-CM

## 2022-05-18 LAB
ALBUMIN SERPL ELPH-MCNC: 3.3 G/DL — LOW (ref 3.5–5)
ALP SERPL-CCNC: 77 U/L — SIGNIFICANT CHANGE UP (ref 40–120)
ALT FLD-CCNC: 34 U/L DA — SIGNIFICANT CHANGE UP (ref 10–60)
ANION GAP SERPL CALC-SCNC: 5 MMOL/L — SIGNIFICANT CHANGE UP (ref 5–17)
APPEARANCE UR: CLEAR — SIGNIFICANT CHANGE UP
AST SERPL-CCNC: 33 U/L — SIGNIFICANT CHANGE UP (ref 10–40)
BASOPHILS # BLD AUTO: 0 K/UL — SIGNIFICANT CHANGE UP (ref 0–0.2)
BASOPHILS NFR BLD AUTO: 0 % — SIGNIFICANT CHANGE UP (ref 0–2)
BILIRUB SERPL-MCNC: 0.4 MG/DL — SIGNIFICANT CHANGE UP (ref 0.2–1.2)
BILIRUB UR-MCNC: NEGATIVE — SIGNIFICANT CHANGE UP
BUN SERPL-MCNC: 13 MG/DL — SIGNIFICANT CHANGE UP (ref 7–18)
CALCIUM SERPL-MCNC: 9.9 MG/DL — SIGNIFICANT CHANGE UP (ref 8.4–10.5)
CHLORIDE SERPL-SCNC: 107 MMOL/L — SIGNIFICANT CHANGE UP (ref 96–108)
CO2 SERPL-SCNC: 26 MMOL/L — SIGNIFICANT CHANGE UP (ref 22–31)
COLOR SPEC: YELLOW — SIGNIFICANT CHANGE UP
CREAT SERPL-MCNC: 0.85 MG/DL — SIGNIFICANT CHANGE UP (ref 0.5–1.3)
DIFF PNL FLD: NEGATIVE — SIGNIFICANT CHANGE UP
EGFR: 74 ML/MIN/1.73M2 — SIGNIFICANT CHANGE UP
EOSINOPHIL # BLD AUTO: 0 K/UL — SIGNIFICANT CHANGE UP (ref 0–0.5)
EOSINOPHIL NFR BLD AUTO: 0 % — SIGNIFICANT CHANGE UP (ref 0–6)
GLUCOSE SERPL-MCNC: 101 MG/DL — HIGH (ref 70–99)
GLUCOSE UR QL: NEGATIVE — SIGNIFICANT CHANGE UP
HCT VFR BLD CALC: 33.5 % — LOW (ref 34.5–45)
HGB BLD-MCNC: 11.3 G/DL — LOW (ref 11.5–15.5)
HYPOSEGMENTATION: PRESENT — SIGNIFICANT CHANGE UP
KETONES UR-MCNC: NEGATIVE — SIGNIFICANT CHANGE UP
LACTATE SERPL-SCNC: 0.7 MMOL/L — SIGNIFICANT CHANGE UP (ref 0.7–2)
LEUKOCYTE ESTERASE UR-ACNC: NEGATIVE — SIGNIFICANT CHANGE UP
LIDOCAIN IGE QN: 187 U/L — SIGNIFICANT CHANGE UP (ref 73–393)
LYMPHOCYTES # BLD AUTO: 17 % — SIGNIFICANT CHANGE UP (ref 13–44)
LYMPHOCYTES # BLD AUTO: 2.99 K/UL — SIGNIFICANT CHANGE UP (ref 1–3.3)
MAGNESIUM SERPL-MCNC: 2 MG/DL — SIGNIFICANT CHANGE UP (ref 1.6–2.6)
MANUAL SMEAR VERIFICATION: SIGNIFICANT CHANGE UP
MCHC RBC-ENTMCNC: 30.2 PG — SIGNIFICANT CHANGE UP (ref 27–34)
MCHC RBC-ENTMCNC: 33.7 GM/DL — SIGNIFICANT CHANGE UP (ref 32–36)
MCV RBC AUTO: 89.6 FL — SIGNIFICANT CHANGE UP (ref 80–100)
METAMYELOCYTES # FLD: 2 % — HIGH (ref 0–0)
MONOCYTES # BLD AUTO: 2.46 K/UL — HIGH (ref 0–0.9)
MONOCYTES NFR BLD AUTO: 14 % — SIGNIFICANT CHANGE UP (ref 2–14)
MYELOCYTES NFR BLD: 1 % — HIGH (ref 0–0)
NEUTROPHILS # BLD AUTO: 9.84 K/UL — HIGH (ref 1.8–7.4)
NEUTROPHILS NFR BLD AUTO: 54 % — SIGNIFICANT CHANGE UP (ref 43–77)
NEUTS BAND # BLD: 2 % — SIGNIFICANT CHANGE UP (ref 0–8)
NITRITE UR-MCNC: NEGATIVE — SIGNIFICANT CHANGE UP
NRBC # BLD: 0 /100 — SIGNIFICANT CHANGE UP (ref 0–0)
NT-PROBNP SERPL-SCNC: 133 PG/ML — HIGH (ref 0–125)
PH UR: 8 — SIGNIFICANT CHANGE UP (ref 5–8)
PLAT MORPH BLD: NORMAL — SIGNIFICANT CHANGE UP
PLATELET # BLD AUTO: 207 K/UL — SIGNIFICANT CHANGE UP (ref 150–400)
POTASSIUM SERPL-MCNC: 4.2 MMOL/L — SIGNIFICANT CHANGE UP (ref 3.5–5.3)
POTASSIUM SERPL-SCNC: 4.2 MMOL/L — SIGNIFICANT CHANGE UP (ref 3.5–5.3)
PROT SERPL-MCNC: 6.8 G/DL — SIGNIFICANT CHANGE UP (ref 6–8.3)
PROT UR-MCNC: NEGATIVE — SIGNIFICANT CHANGE UP
RBC # BLD: 3.74 M/UL — LOW (ref 3.8–5.2)
RBC # FLD: 12.5 % — SIGNIFICANT CHANGE UP (ref 10.3–14.5)
RBC BLD AUTO: NORMAL — SIGNIFICANT CHANGE UP
SODIUM SERPL-SCNC: 138 MMOL/L — SIGNIFICANT CHANGE UP (ref 135–145)
SP GR SPEC: 1.01 — SIGNIFICANT CHANGE UP (ref 1.01–1.02)
TROPONIN I, HIGH SENSITIVITY RESULT: 7.4 NG/L — SIGNIFICANT CHANGE UP
UROBILINOGEN FLD QL: NEGATIVE — SIGNIFICANT CHANGE UP
VARIANT LYMPHS # BLD: 10 % — HIGH (ref 0–6)
WBC # BLD: 17.58 K/UL — HIGH (ref 3.8–10.5)
WBC # FLD AUTO: 17.58 K/UL — HIGH (ref 3.8–10.5)

## 2022-05-18 PROCEDURE — 36415 COLL VENOUS BLD VENIPUNCTURE: CPT

## 2022-05-18 PROCEDURE — 71045 X-RAY EXAM CHEST 1 VIEW: CPT

## 2022-05-18 PROCEDURE — 83605 ASSAY OF LACTIC ACID: CPT

## 2022-05-18 PROCEDURE — 84484 ASSAY OF TROPONIN QUANT: CPT

## 2022-05-18 PROCEDURE — 85025 COMPLETE CBC W/AUTO DIFF WBC: CPT

## 2022-05-18 PROCEDURE — 83690 ASSAY OF LIPASE: CPT

## 2022-05-18 PROCEDURE — 87040 BLOOD CULTURE FOR BACTERIA: CPT

## 2022-05-18 PROCEDURE — 81001 URINALYSIS AUTO W/SCOPE: CPT

## 2022-05-18 PROCEDURE — 93005 ELECTROCARDIOGRAM TRACING: CPT

## 2022-05-18 PROCEDURE — 83735 ASSAY OF MAGNESIUM: CPT

## 2022-05-18 PROCEDURE — 87086 URINE CULTURE/COLONY COUNT: CPT

## 2022-05-18 PROCEDURE — 80053 COMPREHEN METABOLIC PANEL: CPT

## 2022-05-18 PROCEDURE — 71045 X-RAY EXAM CHEST 1 VIEW: CPT | Mod: 26

## 2022-05-18 PROCEDURE — 99285 EMERGENCY DEPT VISIT HI MDM: CPT | Mod: 25

## 2022-05-18 PROCEDURE — 83880 ASSAY OF NATRIURETIC PEPTIDE: CPT

## 2022-05-18 PROCEDURE — 99285 EMERGENCY DEPT VISIT HI MDM: CPT

## 2022-05-18 RX ORDER — SODIUM CHLORIDE 9 MG/ML
1000 INJECTION INTRAMUSCULAR; INTRAVENOUS; SUBCUTANEOUS ONCE
Refills: 0 | Status: COMPLETED | OUTPATIENT
Start: 2022-05-18 | End: 2022-05-18

## 2022-05-18 RX ORDER — KETOROLAC TROMETHAMINE 30 MG/ML
30 SYRINGE (ML) INJECTION ONCE
Refills: 0 | Status: COMPLETED | OUTPATIENT
Start: 2022-05-18 | End: 2022-05-18

## 2022-05-18 RX ADMIN — SODIUM CHLORIDE 1000 MILLILITER(S): 9 INJECTION INTRAMUSCULAR; INTRAVENOUS; SUBCUTANEOUS at 15:01

## 2022-05-18 NOTE — ED ADULT NURSE NOTE - CAS DISCH ACCOMP BY
----- Message from Lolis Draper DO sent at 1/7/2022 12:16 PM CST -----  Please notify that COVID was positive. Advise quarantine per CDC recs.  Nancy LYNCH  
Spoke with patient. Informed patient of Positive covid results. Patient notified to quarantine for 5 days from the onset of symptoms if they are feeling better they can leave the house after 5 days but should wear a mask for an additional 5 day. If not feeling better patient should quarantine for the full 10 days. Patient informed to notify anyone that they  have been around of their positive results.  Patient notified that if their symptoms worsen that they should go directly to the ER.    No questions at this time.      
Family

## 2022-05-18 NOTE — ED PROVIDER NOTE - PATIENT PORTAL LINK FT
You can access the FollowMyHealth Patient Portal offered by [facility] by registering at the following website: [email or web address]. By joining [employer]’s FollowMyHealth portal, you will also be able to view your health information using other applications (apps) compatible with our system.

## 2022-05-18 NOTE — ED PROVIDER NOTE - NSFOLLOWUPINSTRUCTIONS_ED_ALL_ED_FT
You were seen in the emergency department after fainting.     Please follow-up with your primary care doctor in the next 24-48 hours.     Please follow-up with your oncologist in the next 24-48 hours.     Please continue to drink plenty of fluids.     If you have any worsening symptoms, severe headache, chest pain, shortness of breath or dizziness, please return to the emergency department.

## 2022-05-18 NOTE — ED PROVIDER NOTE - NS ED MD DISPO DISCHARGE CCDA
Lipid panel. Target LDL < 60 or non HDL TC< 80  Increase  Crestor 5 to 10  mg    Patient/Caregiver provided printed discharge information.

## 2022-05-18 NOTE — ED PROVIDER NOTE - PROGRESS NOTE DETAILS
patient remains asymptomatic. updated on results. will discharge with oncology follow-up. Rommel Wolff

## 2022-05-18 NOTE — ED PROVIDER NOTE - PHYSICAL EXAMINATION
General: uncomfortable appearing female, no acute distress   HEENT: normocephalic, atraumatic   Respiratory: normal work of breathing, lungs clear to auscultation bilaterally   Cardiac: regular rate and rhythm   Abdomen: soft, non-tender, no guarding or rebound   MSK: no swelling or tenderness of lower extremities, moving all extremities spontaneously   Skin: warm, dry   Neuro: A&Ox3  Psych: appropriate affect General: uncomfortable appearing female, no acute distress   HEENT: normocephalic, atraumatic   Respiratory: normal work of breathing, lungs clear to auscultation bilaterally   Cardiac: regular rate and rhythm   Abdomen: soft, non-tender, no guarding or rebound   MSK: no swelling or tenderness of lower extremities, moving all extremities spontaneously   Skin: warm, dry   Neuro: A&Ox3, ambulating without assistance   Psych: appropriate affect

## 2022-05-18 NOTE — ED PROVIDER NOTE - CLINICAL SUMMARY MEDICAL DECISION MAKING FREE TEXT BOX
67F presenting with syncope. possible medication side effect vs viral etiology. will get labs, ekg, cxr, UA. will reassess.

## 2022-05-18 NOTE — ED ADULT NURSE NOTE - DISCHARGE DATE/TIME
"As discussed with you , I shall await for the CT scan report which you have done at \" [address] [place] [address] , [place] [facility] [place] \"  Please make lab only appointment to get this labs done for your abnormal CT results in the past.     ==================================    Patient Education     Computed Tomography (CT)     During the test, relax and remain as still as you can.   Computed tomography (CT) is a test that combines X-rays and computer scans. The result is a detailed picture that can show problems with soft tissues- (such as the lining of your sinuses), organs (such as your kidneys or lungs), blood vessels, and bones.   Tell the technologist   Be sure to tell the technologist if you:    Have allergies or kidney problems    Take diabetes medicine    Are pregnant or think you may be    Ate or drank anything before the test  Before your test    Be sure to tell your healthcare provider if you have ever had a reaction to contrast material (\"X-ray dye\"). If you have had a reaction, you may need to take medicine before your scan, so be sure to tell your provider ahead of time.     Be sure to mention the medicines you take. Ask if it's OK to take them before the test.     Follow any directions you re given for not eating or drinking before the procedure. Your provider will give you instructions if required. You may be required to drink contrast by mouth before arriving for the study depending on the type of exam you are having. Your provider or the imaging site will provide this for you.    The length of the procedure may vary, depending on your condition and your provider's practices.    Arrive on time to check in.    When you arrive, you may be asked to change into a hospital gown. Remove all metal near the part of your body that will be scanned, including jewelry, eyeglasses, and dentures. Women may need to remove any bra that has metal underwire.     During your test    You may be given contrast through an IV " (intravenous) line or by mouth.    You will lie on a table. The table slides into the CT scanner.    The technologist will ask you to hold your breath for a few seconds during your scan.    After your test    You can go back to your normal diet and activities right away. Any contrast will pass naturally through your body within a day.    Before leaving, you may need to wait briefly while your images are being reviewed. Your healthcare provider will discuss the test results with you during a follow-up appointment or over the phone.    Your next appointment is:__________________    Román last reviewed this educational content on 6/1/2019 2000-2021 [employer]. All rights reserved. This information is not intended as a substitute for professional medical care. Always follow your healthcare professional's instructions.            18-May-2022 16:08

## 2022-05-18 NOTE — ED PROVIDER NOTE - OBJECTIVE STATEMENT
67F, pmh of breast cancer (s/p double mastectomy) RYAN on CPAP (non compliant), hypertension, hyperlipidemia, depression, presenting after a syncopal episode. patient reports last chemotherapy one week ago. has been getting injections for "low white count", last injection yesterday. since then has had body aches, weakness and abdominal pain and a bad taste in her mouth. went to the store and felt hot and remembers laying her head on a counter. was told by bystanders that she fainted. continues to feel weak. no similar symptoms in the past.

## 2022-05-19 LAB
CULTURE RESULTS: SIGNIFICANT CHANGE UP
SPECIMEN SOURCE: SIGNIFICANT CHANGE UP

## 2022-05-23 LAB
CULTURE RESULTS: SIGNIFICANT CHANGE UP
CULTURE RESULTS: SIGNIFICANT CHANGE UP
SPECIMEN SOURCE: SIGNIFICANT CHANGE UP
SPECIMEN SOURCE: SIGNIFICANT CHANGE UP

## 2022-05-26 LAB — SARS-COV-2 N GENE NPH QL NAA+PROBE: NOT DETECTED

## 2022-08-18 ENCOUNTER — EMERGENCY (EMERGENCY)
Facility: HOSPITAL | Age: 67
LOS: 1 days | Discharge: HOME | End: 2022-08-18
Attending: EMERGENCY MEDICINE
Payer: MEDICARE

## 2022-08-18 VITALS
OXYGEN SATURATION: 98 % | WEIGHT: 159.39 LBS | HEART RATE: 67 BPM | TEMPERATURE: 98 F | HEIGHT: 65 IN | DIASTOLIC BLOOD PRESSURE: 71 MMHG | SYSTOLIC BLOOD PRESSURE: 120 MMHG | RESPIRATION RATE: 20 BRPM

## 2022-08-18 DIAGNOSIS — Z98.890 OTHER SPECIFIED POSTPROCEDURAL STATES: Chronic | ICD-10-CM

## 2022-08-18 DIAGNOSIS — Z98.891 HISTORY OF UTERINE SCAR FROM PREVIOUS SURGERY: Chronic | ICD-10-CM

## 2022-08-18 DIAGNOSIS — Z90.13 ACQUIRED ABSENCE OF BILATERAL BREASTS AND NIPPLES: Chronic | ICD-10-CM

## 2022-08-18 LAB
BASOPHILS # BLD AUTO: 0.03 K/UL — SIGNIFICANT CHANGE UP (ref 0–0.2)
BASOPHILS NFR BLD AUTO: 0.7 % — SIGNIFICANT CHANGE UP (ref 0–2)
EOSINOPHIL # BLD AUTO: 0.11 K/UL — SIGNIFICANT CHANGE UP (ref 0–0.5)
EOSINOPHIL NFR BLD AUTO: 2.5 % — SIGNIFICANT CHANGE UP (ref 0–6)
HCT VFR BLD CALC: 32.2 % — LOW (ref 34.5–45)
HGB BLD-MCNC: 10.4 G/DL — LOW (ref 11.5–15.5)
IMM GRANULOCYTES NFR BLD AUTO: 0.2 % — SIGNIFICANT CHANGE UP (ref 0–1.5)
LYMPHOCYTES # BLD AUTO: 2 K/UL — SIGNIFICANT CHANGE UP (ref 1–3.3)
LYMPHOCYTES # BLD AUTO: 44.6 % — HIGH (ref 13–44)
MCHC RBC-ENTMCNC: 31.6 PG — SIGNIFICANT CHANGE UP (ref 27–34)
MCHC RBC-ENTMCNC: 32.3 GM/DL — SIGNIFICANT CHANGE UP (ref 32–36)
MCV RBC AUTO: 97.9 FL — SIGNIFICANT CHANGE UP (ref 80–100)
MONOCYTES # BLD AUTO: 0.46 K/UL — SIGNIFICANT CHANGE UP (ref 0–0.9)
MONOCYTES NFR BLD AUTO: 10.3 % — SIGNIFICANT CHANGE UP (ref 2–14)
NEUTROPHILS # BLD AUTO: 1.87 K/UL — SIGNIFICANT CHANGE UP (ref 1.8–7.4)
NEUTROPHILS NFR BLD AUTO: 41.7 % — LOW (ref 43–77)
NRBC # BLD: 0 /100 WBCS — SIGNIFICANT CHANGE UP (ref 0–0)
PLATELET # BLD AUTO: 264 K/UL — SIGNIFICANT CHANGE UP (ref 150–400)
RBC # BLD: 3.29 M/UL — LOW (ref 3.8–5.2)
RBC # FLD: 13.8 % — SIGNIFICANT CHANGE UP (ref 10.3–14.5)
WBC # BLD: 4.48 K/UL — SIGNIFICANT CHANGE UP (ref 3.8–10.5)
WBC # FLD AUTO: 4.48 K/UL — SIGNIFICANT CHANGE UP (ref 3.8–10.5)

## 2022-08-18 PROCEDURE — 99282 EMERGENCY DEPT VISIT SF MDM: CPT

## 2022-08-18 PROCEDURE — 85025 COMPLETE CBC W/AUTO DIFF WBC: CPT

## 2022-08-18 PROCEDURE — 36415 COLL VENOUS BLD VENIPUNCTURE: CPT

## 2022-08-18 PROCEDURE — 99284 EMERGENCY DEPT VISIT MOD MDM: CPT

## 2022-08-18 NOTE — ED PROVIDER NOTE - OBJECTIVE STATEMENT
66 y/o female comes in because of left arm swelling today. History of similar swelling in the past after she had bilateral mastectomy in March of this year. Today, patient called her doctor's office who told her to come to the emergency room because she might have a clot or an infection. No history of trauma. No known drug allergies.

## 2022-08-18 NOTE — ED PROVIDER NOTE - PATIENT PORTAL LINK FT
You can access the FollowMyHealth Patient Portal offered by [facility] by registering at the following website: [email or web address]. By joining [employer]’s FollowMyHealth portal, you will also be able to view your health information using other applications (apps) compatible with our system.

## 2022-08-18 NOTE — ED PROVIDER NOTE - PHYSICAL EXAMINATION
Musculoskeletal: no deformity, no visible swelling, no discoloration, full range of motion of the wrist and the arm; mild tenderness in left wrist

## 2022-08-18 NOTE — ED PROVIDER NOTE - NSICDXPASTSURGICALHX_GEN_ALL_CORE_FT
PAST SURGICAL HISTORY:  History of breast surgery     History of  x 2      History of bilateral mastectomy

## 2022-08-19 ENCOUNTER — EMERGENCY (EMERGENCY)
Facility: HOSPITAL | Age: 67
LOS: 1 days | Discharge: HOME | End: 2022-08-19
Attending: STUDENT IN AN ORGANIZED HEALTH CARE EDUCATION/TRAINING PROGRAM
Payer: MEDICARE

## 2022-08-19 VITALS
SYSTOLIC BLOOD PRESSURE: 96 MMHG | WEIGHT: 158.07 LBS | TEMPERATURE: 98 F | HEART RATE: 73 BPM | RESPIRATION RATE: 16 BRPM | DIASTOLIC BLOOD PRESSURE: 61 MMHG | OXYGEN SATURATION: 98 % | HEIGHT: 65 IN

## 2022-08-19 DIAGNOSIS — Z98.890 OTHER SPECIFIED POSTPROCEDURAL STATES: Chronic | ICD-10-CM

## 2022-08-19 DIAGNOSIS — Z90.13 ACQUIRED ABSENCE OF BILATERAL BREASTS AND NIPPLES: Chronic | ICD-10-CM

## 2022-08-19 DIAGNOSIS — Z98.891 HISTORY OF UTERINE SCAR FROM PREVIOUS SURGERY: Chronic | ICD-10-CM

## 2022-08-19 PROCEDURE — 99284 EMERGENCY DEPT VISIT MOD MDM: CPT

## 2022-08-19 PROCEDURE — 99284 EMERGENCY DEPT VISIT MOD MDM: CPT | Mod: 25

## 2022-08-19 PROCEDURE — 93971 EXTREMITY STUDY: CPT | Mod: 26,LT

## 2022-08-19 PROCEDURE — 93971 EXTREMITY STUDY: CPT

## 2022-08-19 NOTE — ED PROVIDER NOTE - PROGRESS NOTE DETAILS
patient doppler negative. remains neurovascularly  intact. instructed to f.u her pmd and oncologist. return precautions instructed.

## 2022-08-19 NOTE — ED PROVIDER NOTE - PATIENT PORTAL LINK FT
You can access the FollowMyHealth Patient Portal offered by [facility] by registering at the following website: [email or web address]. By joining [employer]’s FollowMyHealth portal, you will also be able to view your health information using other applications (apps) compatible with our system.

## 2022-08-19 NOTE — ED PROVIDER NOTE - IV ALTEPLASE ADMIN OUTSIDE HIDDEN
71 y/o F with PMH HTN, HLD, depression, anxiety, h/o suicidal attempt p/w headache and nausea x 2 week. Pt reports stopping her gabapentin a few days prior. states she has been having increased nausea. states that the symptoms worsened in the last few days. Pt reports not having any abdominal pain, vomiting, fever, chills. diarrhea. She states she had a ct scan completed in feb which found diverticultitis and a calcified lung nodule. She is on klonipin for anxiety. denies Hi/SI  denies fever, chills, chest pain, SOB, abdominal pain, diarrhea, dysuria, syncope, bleeding, new rash,weakness, numbness, blurred vision  + nausea  ROS  otherwise negative as per HPI  Gen: Awake, Alert, WD, WN, NAD  Head:  NC/AT  Eyes:  PERRL, EOMI, Conjunctiva pink, lids normal, no scleral icterus  ENT: OP clear,, moist mucus membranes  Neck: supple, nontender, no meningismus, no JVD, trachea midline  Cardiac/CV:  S1 S2, RRR, no M/G/R  Respiratory/Pulm:  CTAB, good air movement, normal resp effort, no wheezes/stridor/retractions/rales/rhonchi  Gastrointestinal/Abdomen:  Soft, nontender, nondistended, +BS, no rebound/guarding  Back:  no CVAT, no MLT  Ext:  warm, well perfused, moving all extremities spontaneously, no peripheral edema, distal pulses intact  Skin: intact, no rash  Neuro:  AAOx3, sensation intact, motor 5/5 x 4 extremities, normal gait, speech clear  mdm as above show

## 2022-08-19 NOTE — ED PROVIDER NOTE - CLINICAL SUMMARY MEDICAL DECISION MAKING FREE TEXT BOX
Patient presenting with left arm swelling. no erythema on exam will obtain doppler, r/o dvt and reassess

## 2022-08-19 NOTE — ED PROVIDER NOTE - OBJECTIVE STATEMENT
67 y.o w/ pmh of b/l masectomy presenting with left arm swelling x 2 days. was seen here yesterday for same, sent in by her oncologist concern for dvt, marco antonioo tech was no longer in house yesterday, told to come back today for sono. denies cp, sob, n, v, trauma, fall. endorses pain and swelling predominately to left upper arm.

## 2022-08-19 NOTE — ED PROVIDER NOTE - PHYSICAL EXAMINATION
left arm mild swollen compared to right  no erythema or warmth  distal cap refill and pulse intact   sensation intact

## 2022-08-19 NOTE — ED PROVIDER NOTE - NSFOLLOWUPINSTRUCTIONS_ED_ALL_ED_FT
Lymphedema       Lymphedema is swelling that is caused by the abnormal collection of lymph in the tissues under the skin. Lymph is excess fluid from the tissues in your body that is removed through the lymphatic system. This system is part of your body's defense system (immune system) and includes lymph nodes and lymph vessels. The lymph vessels collect and carry the excess fluid, fats, proteins, and waste from the tissues of the body to the bloodstream. This system also works to clean and remove bacteria and waste products from the body.    Lymphedema occurs when the lymphatic system is blocked. When the lymph vessels or lymph nodes are blocked or damaged, lymph does not drain properly. This causes an abnormal buildup of lymph, which leads to swelling in the affected area. This may include the trunk area, or an arm or leg. Lymphedema cannot be cured by medicines, but various methods can be used to help reduce the swelling.      What are the causes?    The cause of this condition depends on the type of lymphedema that you have.  •Primary lymphedema is caused by the absence of lymph vessels or having abnormal lymph vessels at birth.    •Secondary lymphedema occurs when lymph vessels are blocked or damaged. Secondary lymphedema is more common. Common causes of lymph vessel blockage include:  •Skin infection, such as cellulitis.      •Infection by parasites (filariasis).      •Injury.      •Radiation therapy.      •Cancer.      •Formation of scar tissue.      •Surgery.          What are the signs or symptoms?    Symptoms of this condition include:  •Swelling of the arm or leg.      •A heavy or tight feeling in the arm or leg.      •Swelling of the feet, toes, or fingers. Shoes or rings may fit more tightly than before.      •Redness of the skin over the affected area.      •Limited movement of the affected limb.      •Sensitivity to touch or discomfort in the affected limb.        How is this diagnosed?    This condition may be diagnosed based on:  •Your symptoms and medical history.      •A physical exam.      •Bioimpedance spectroscopy. In this test, painless electrical currents are used to measure fluid levels in your body.    •Imaging tests, such as:  •MRI.      •CT scan.      •Duplex ultrasound. This test uses sound waves to produce images of the vessels and the blood flow on a screen.      •Lymphoscintigraphy. In this test, a low dose of a radioactive substance is injected to trace the flow of lymph through your lymph vessels.      •Lymphangiography. In this test, a contrast dye is injected into the lymph vessel to help show blockages.          How is this treated?     If an underlying condition is causing the lymphedema, that condition will be treated. For example, antibiotic medicines may be used to treat an infection.    Treatment for this condition will depend on the cause of your lymphedema. Treatment may include:•Complete decongestive therapy (CDT). This is done by a certified lymphedema therapist to reduce fluid congestion. This therapy includes:  •Skin care.      •Compression wrapping of the affected area.      •Manual lymph drainage. This is a special massage technique that promotes lymph drainage out of a limb.      •Specific exercises. Certain exercises can help fluid move out of the affected limb.      •Compression. Various methods may be used to apply pressure to the affected limb to reduce the swelling. They include:  •Wearing compression stockings or sleeves on the affected limb.      •Wrapping the affected limb with special bandages.        •Surgery. This is usually done for severe cases only. For example, surgery may be done if you have trouble moving the limb or if the swelling does not get better with other treatments.        Follow these instructions at home:    Self-care   •The affected area is more likely to become injured or infected. Take these steps to help prevent infection:  •Keep the affected area clean and dry.      •Use approved creams or lotions to keep the skin moisturized.    •Protect your skin from cuts:   •Use gloves while cooking or gardening.      •Do not walk barefoot.       •If you shave the affected area, use an electric razor.          • Do not wear tight clothes, shoes, or jewelry.      •Eat a healthy diet that includes a lot of fruits and vegetables.      Activity     •Do exercises as told by your health care provider.      • Do not sit with your legs crossed.      •When possible, keep the affected limb raised (elevated) above the level of your heart.      •Avoid carrying things with an arm that is affected by lymphedema.      General instructions     •Wear compression stockings or sleeves as told by your health care provider.      •Note any changes in size of the affected limb. You may be instructed to take regular measurements and keep track of them.      •Take over-the-counter and prescription medicines only as told by your health care provider.      •If you were prescribed an antibiotic medicine, take or apply it as told by your health care provider. Do not stop using the antibiotic even if you start to feel better or if your condition improves.      • Do not use heating pads or ice packs on the affected area.      •Avoid having blood draws, IV insertions, or blood pressure checks on the affected limb.      •Keep all follow-up visits. This is important.        Contact a health care provider if you:    •Continue to have swelling in your limb.      •Have fluid leaking from the skin of your swollen limb.      •Have a cut that does not heal.      •Have redness or pain in the affected area.      •Develop purplish spots, rash, blisters, or sores (lesions) on your affected limb.        Get help right away if you:    •Have new swelling in your limb that starts suddenly.      •Have shortness of breath or chest pain.      •Have a fever or chills.      These symptoms may represent a serious problem that is an emergency. Do not wait to see if the symptoms will go away. Get medical help right away. Call your local emergency services (911 in the [place] Do not drive yourself to the hospital.       Summary    •Lymphedema is swelling that is caused by the abnormal collection of lymph in the tissues under the skin.      •Lymph is fluid from the tissues in your body that is removed through the lymphatic system. This system collects and carries excess fluid, fats, proteins, and wastes from the tissues of the body to the bloodstream.      •Lymphedema causes swelling, pain, and redness in the affected area. This may include the trunk area, or an arm or leg.      •Treatment for this condition may depend on the cause of your lymphedema. Treatment may include treating the underlying cause, complete decongestive therapy (CDT), compression methods, or surgery.      This information is not intended to replace advice given to you by your health care provider. Make sure you discuss any questions you have with your health care provider.

## 2022-09-12 ENCOUNTER — APPOINTMENT (OUTPATIENT)
Dept: SURGERY | Facility: CLINIC | Age: 67
End: 2022-09-12

## 2022-09-12 VITALS
HEIGHT: 65 IN | WEIGHT: 153 LBS | DIASTOLIC BLOOD PRESSURE: 71 MMHG | SYSTOLIC BLOOD PRESSURE: 118 MMHG | HEART RATE: 64 BPM | BODY MASS INDEX: 25.49 KG/M2

## 2022-09-12 VITALS — TEMPERATURE: 97.9 F

## 2022-09-12 PROCEDURE — 99213 OFFICE O/P EST LOW 20 MIN: CPT

## 2022-09-12 NOTE — HISTORY OF PRESENT ILLNESS
[de-identified] :  Ms. SMITH is s/p right simple and left modified mastectomy and sentinel lymph node biopsy on 03/09/2022. right mastectomy pathology results were consistent with benign tissue and left mastectomy pathology results were consistent with Multifocal (at least 4 foci ranging from 8-20 mm) invasive moderately differentiated ductal carcinoma , margins are clean. Thirteen axillary lymph nodes are negative for metastatic carcinoma. she is being followed by DR William (oncology) she is returning to the office with a cc of having left forearm swelling. the swelling stared after the chemotherapy. she states that she went to  ED and had an US that was negative for DVT. she reports pain in the shoulder on movement.   [de-identified] : Patient reports no interval changes to her overall health status or medical history

## 2022-09-12 NOTE — PHYSICAL EXAM
[Alert] : alert [Oriented to Person] : oriented to person [Oriented to Place] : oriented to place [Oriented to Time] : oriented to time [Calm] : calm [de-identified] : She  is alert, well-groomed, and in NAD\par   [de-identified] : anicteric.  Nasal mucosa pink, septum midline. Oral mucosa pink.  Tongue midline, Pharynx without exudates.\par   [de-identified] : Neck supple. Trachea midline. Thyroid isthmus barely palpable, lobes not felt. [de-identified] : right mastectomy   wound healed well.   no signs of complications.  left mastectomy  wound healed well.   no palpable supraclavicular or axillary  Lymph nods    [de-identified] : very minimal forearm swelling

## 2022-09-12 NOTE — DATA REVIEWED
[FreeTextEntry1] : ACC: [id number] EXAM: US DPLX UPR EXT VEINS LTD LT\par \par PROCEDURE DATE: 08/19/2022\par \par \par \par INTERPRETATION: CLINICAL INFORMATION: Left upper extremity swelling\par \par COMPARISON: None available.\par \par TECHNIQUE: Duplex sonography of the LEFT UPPER extremity veins with color and spectral Doppler, with and without compression.\par \par FINDINGS:\par \par The left internal jugular, subclavian, axillary, brachial, radial and ulnar veins are patent and compressible where applicable. The basilic and cephalic veins (superficial veins) are patent and without thrombus.\par \par Doppler examination shows normal spontaneous and phasic flow.\par \par IMPRESSION:\par No evidence of left upper extremity deep venous thrombosis.\par \par  \par \par JIMMIE YOO MD; Attending Radiologist\par This document has been electronically signed. Aug 19 2022 1:44PM

## 2022-09-12 NOTE — ASSESSMENT
[FreeTextEntry1] : Ms. SMITH is doing well.   She has a very minor left forearm swelling . All surgical incisions healed well and as expected. There is no evidence of infection or complication, and she is progressing as expected.  she was advised to wear compression sleeve and use her arm active. Patient's questions and concerns addressed to patient's satisfaction.

## 2023-01-12 ENCOUNTER — APPOINTMENT (OUTPATIENT)
Dept: SURGERY | Facility: CLINIC | Age: 68
End: 2023-01-12
Payer: MEDICARE

## 2023-01-12 VITALS
HEART RATE: 64 BPM | SYSTOLIC BLOOD PRESSURE: 119 MMHG | HEIGHT: 65 IN | BODY MASS INDEX: 24.99 KG/M2 | WEIGHT: 150 LBS | DIASTOLIC BLOOD PRESSURE: 72 MMHG | TEMPERATURE: 97.2 F

## 2023-01-12 PROCEDURE — 99213 OFFICE O/P EST LOW 20 MIN: CPT

## 2023-01-12 NOTE — HISTORY OF PRESENT ILLNESS
[de-identified] : This is  a 67 year   old patient presenting today for a  exam. Today Ms. SMITH offers no complaints. patient reports no fever or chills. patient  continues to have occasional discomfort in the left  upper arm and pain in the shoulder when she lifts an arm. . Her surgical incisions  healed well. No signs of recurrence. patient reports good bowel movements and appetite\par PERTINENT HISTORY: \par  Ms. SMITH is s/p right simple and left modified mastectomy and sentinel lymph node biopsy on 03/09/2022. right mastectomy pathology results were consistent with benign tissue and left mastectomy pathology results were consistent with Multifocal (at least 4 foci ranging from 8-20 mm) invasive moderately differentiated ductal carcinoma , margins are clean. Thirteen axillary lymph nodes are negative for metastatic carcinoma. she is being followed by DR William (oncology)

## 2023-01-12 NOTE — ASSESSMENT
[FreeTextEntry1] : Ms. SMITH is doing well, with excellent post-operative recovery. All surgical incisions  healed well and as expected. There is no evidence of complication, and she is progressing as expected.  she was advised to consult orthopedic surgery for shoulder pain  Patient's questions and concerns addressed to patient's satisfaction.

## 2023-01-12 NOTE — PHYSICAL EXAM
[Alert] : alert [Oriented to Person] : oriented to person [Oriented to Place] : oriented to place [Oriented to Time] : oriented to time [Calm] : calm [de-identified] : She  is alert, well-groomed, and in NAD\par   [de-identified] : anicteric.  Nasal mucosa pink, septum midline. Oral mucosa pink.  Tongue midline, Pharynx without exudates.\par   [de-identified] : Neck supple. Trachea midline. Thyroid isthmus barely palpable, lobes not felt. [de-identified] : right mastectomy   wound healed well.   no signs of complications.  left mastectomy  wound healed well.   no palpable supraclavicular or axillary  Lymph nods    [de-identified] : very minimal forearm swelling

## 2023-06-12 ENCOUNTER — APPOINTMENT (OUTPATIENT)
Dept: SURGERY | Facility: CLINIC | Age: 68
End: 2023-06-12
Payer: MEDICARE

## 2023-06-12 VITALS
HEART RATE: 64 BPM | SYSTOLIC BLOOD PRESSURE: 110 MMHG | WEIGHT: 158 LBS | HEIGHT: 65 IN | BODY MASS INDEX: 26.33 KG/M2 | DIASTOLIC BLOOD PRESSURE: 71 MMHG

## 2023-06-12 PROCEDURE — 99213 OFFICE O/P EST LOW 20 MIN: CPT

## 2023-06-12 NOTE — PHYSICAL EXAM
[Alert] : alert [Oriented to Person] : oriented to person [Oriented to Place] : oriented to place [Oriented to Time] : oriented to time [Calm] : calm [de-identified] : She  is alert, well-groomed, and in NAD\par   [de-identified] : anicteric.  Nasal mucosa pink, septum midline. Oral mucosa pink.  Tongue midline, Pharynx without exudates.\par   [de-identified] : Neck supple. Trachea midline. Thyroid isthmus barely palpable, lobes not felt. [de-identified] : right mastectomy   wound healed well.   no signs of complications.  left mastectomy  wound healed well.   no palpable supraclavicular or axillary  Lymph nods    [de-identified] : very minimal forearm swelling

## 2023-06-12 NOTE — ASSESSMENT
[FreeTextEntry1] : Ms. SMITH is doing well, with excellent post-operative recovery. All surgical incisions  healed  well and as expected. There is no evidence of infection or complication, and she is progressing as expected.     Patient's questions and concerns addressed to patient's satisfaction.

## 2023-06-12 NOTE — HISTORY OF PRESENT ILLNESS
[de-identified] : This is  a 68 year   old patient presenting today for a breast exam.    Ms. SMITH offers no complaints. patient reports no fever or chills. patient continues to have occasional discomfort in the left upper arm and pain in the shoulder when she lifts an arm.  She consulted Dr Hernandez and was advised to have surgery.  Her surgical incisions healed well. No signs of recurrence. patient reports good bowel movements and appetite\par  \par  \par PERTINENT HISTORY: \par  Ms. SMITH is s/p right simple and left modified mastectomy and sentinel lymph node biopsy on 03/09/2022. right mastectomy pathology results were consistent with benign tissue and left mastectomy pathology results were consistent with Multifocal (at least 4 foci ranging from 8-20 mm) invasive moderately differentiated ductal carcinoma , margins are clean. Thirteen axillary lymph nodes are negative for metastatic carcinoma. she is being followed by DR William (oncology)  [de-identified] : Patient reports no interval changes to her overall health status or medical history \par \par

## 2023-12-11 ENCOUNTER — APPOINTMENT (OUTPATIENT)
Dept: SURGERY | Facility: CLINIC | Age: 68
End: 2023-12-11
Payer: MEDICARE

## 2023-12-11 VITALS
SYSTOLIC BLOOD PRESSURE: 118 MMHG | HEIGHT: 65 IN | HEART RATE: 68 BPM | DIASTOLIC BLOOD PRESSURE: 65 MMHG | BODY MASS INDEX: 25.16 KG/M2 | OXYGEN SATURATION: 97 % | WEIGHT: 151 LBS

## 2023-12-11 DIAGNOSIS — I89.0 LYMPHEDEMA, NOT ELSEWHERE CLASSIFIED: ICD-10-CM

## 2023-12-11 PROCEDURE — 99213 OFFICE O/P EST LOW 20 MIN: CPT

## 2024-06-06 ENCOUNTER — NON-APPOINTMENT (OUTPATIENT)
Age: 69
End: 2024-06-06

## 2024-06-17 ENCOUNTER — APPOINTMENT (OUTPATIENT)
Dept: SURGERY | Facility: CLINIC | Age: 69
End: 2024-06-17
Payer: MEDICARE

## 2024-06-17 VITALS
HEIGHT: 65 IN | OXYGEN SATURATION: 98 % | WEIGHT: 152 LBS | DIASTOLIC BLOOD PRESSURE: 66 MMHG | SYSTOLIC BLOOD PRESSURE: 122 MMHG | HEART RATE: 65 BPM | BODY MASS INDEX: 25.33 KG/M2

## 2024-06-17 DIAGNOSIS — C50.912 MALIGNANT NEOPLASM OF UNSPECIFIED SITE OF LEFT FEMALE BREAST: ICD-10-CM

## 2024-06-17 PROCEDURE — 99213 OFFICE O/P EST LOW 20 MIN: CPT

## 2024-06-17 NOTE — PLAN
[FreeTextEntry1] : patient will follow up in   months for exam  or if needed. Warning signs, follow up, and restrictions were discussed with the patient.

## 2024-06-17 NOTE — PHYSICAL EXAM
[Alert] : alert [Oriented to Person] : oriented to person [Oriented to Place] : oriented to place [Oriented to Time] : oriented to time [Calm] : calm [de-identified] : She  is alert, well-groomed, and in NAD\par    [de-identified] : anicteric.  Nasal mucosa pink, septum midline. Oral mucosa pink.  Tongue midline, Pharynx without exudates.\par    [de-identified] : Neck supple. Trachea midline. Thyroid isthmus barely palpable, lobes not felt. [de-identified] : right mastectomy   wound healed well.   left mastectomy  wound healed well.    [de-identified] : very minimal  left forearm swelling

## 2024-06-17 NOTE — ASSESSMENT
[FreeTextEntry1] : Impression:  Breast cancer, left breast   Ms. SMITH is doing well, with excellent post-operative recovery. All surgical incisions healed well and as expected. There is no evidence of infection or complication, and she is progressing as expected.  no recurrence Patient's questions and concerns addressed to patient's satisfaction.

## 2024-06-17 NOTE — HISTORY OF PRESENT ILLNESS
[de-identified] : This is  a 69 year   old patient presenting today for an exam. Patient reports no interval changes to her overall health status or medical history .    Today  Ms. SMITH offers no complaints.   Patient denies any fever, night sweats or loss of appetite.      Patient is on no hormonal replacement therapy.   PERTINENT HISTORY:   Ms. SMITH is s/p right simple and left modified mastectomy and sentinel lymph node biopsy on 03/09/2022. right mastectomy pathology results were consistent with benign tissue and left mastectomy pathology results were consistent with Multifocal (at least 4 foci ranging from 8-20 mm) invasive moderately differentiated ductal carcinoma , margins are clean. Thirteen axillary lymph nodes are negative for metastatic carcinoma. she is being followed by DR William (oncology)  [de-identified] : Ms. SMITH  is s/p left shoulder surgery with DR Hernandez in March She had her chemo port removal on 06/13/2024.

## 2024-09-12 NOTE — PATIENT PROFILE ADULT - FALL HARM RISK - FACTORS NURSING JUDGEMENT
OPERATIVE REPORT



DATE OF SERVICE :



She was in the ER 27.



DIAGNOSES:

Left ureteral calculus with obstruction, left pyonephrosis, urinary tract infection

with sepsis.



PROCEDURE:

Cystoscopy, placement of double-J catheter left 6 x 22.



INDICATIONS FOR PROCEDURE:

Ms Flores is a 46-year-old female, who presented with a 48-hour history of flank pain.

She has no history of stones.  She was evaluated and found to have,

1. Urinary tract infection.

2. An obstructing 1 cm UPJ stone.

3. Temperature of 100.

4. An elevated white count of 23,000 and infected urine.



Because of this, she has impending severe sepsis.  Her lactic acid was mildly elevated

at 2.8.  She comes for stent to relieve the obstruction during infected urine to allow

the antibiotics to work.



DESCRIPTION OF PROCEDURE:

The patient was brought to the operating suite, given general anesthesia, placed in

lithotomy position with a sterile prep and drape.  Cystoscopy identified diffuse

cystitis, acute.  Both ureter orifices are normal.  A left 0.035 wire was passed up the

ureter and passes the impacted stone.  Purulent urine started draining out of the

ureter after this.  Over the wire was passed a 6 x 22 double-J catheter with good coils

in the renal pelvis and the bladder.  The stone popped back into the renal pelvis.  The

bladder was drained.  The patient was awakened and returned to recovery room in good

condition.  She tolerated the procedure well.  She will be observed in the hospital

overnight.





MMODL / IJN: [id number] / Job#: [id number]
No

## 2024-11-04 ENCOUNTER — APPOINTMENT (OUTPATIENT)
Dept: UROLOGY | Facility: CLINIC | Age: 69
End: 2024-11-04
Payer: MEDICARE

## 2024-11-04 ENCOUNTER — APPOINTMENT (OUTPATIENT)
Dept: SURGERY | Facility: CLINIC | Age: 69
End: 2024-11-04
Payer: MEDICARE

## 2024-11-04 VITALS
BODY MASS INDEX: 25.16 KG/M2 | HEIGHT: 65 IN | DIASTOLIC BLOOD PRESSURE: 68 MMHG | HEART RATE: 58 BPM | WEIGHT: 151 LBS | SYSTOLIC BLOOD PRESSURE: 122 MMHG | OXYGEN SATURATION: 98 %

## 2024-11-04 VITALS
SYSTOLIC BLOOD PRESSURE: 133 MMHG | BODY MASS INDEX: 25.16 KG/M2 | DIASTOLIC BLOOD PRESSURE: 74 MMHG | HEART RATE: 70 BPM | HEIGHT: 65 IN | OXYGEN SATURATION: 100 % | WEIGHT: 151 LBS | TEMPERATURE: 96.8 F

## 2024-11-04 DIAGNOSIS — R31.0 GROSS HEMATURIA: ICD-10-CM

## 2024-11-04 DIAGNOSIS — C50.912 MALIGNANT NEOPLASM OF UNSPECIFIED SITE OF LEFT FEMALE BREAST: ICD-10-CM

## 2024-11-04 PROCEDURE — G2211 COMPLEX E/M VISIT ADD ON: CPT

## 2024-11-04 PROCEDURE — 99204 OFFICE O/P NEW MOD 45 MIN: CPT

## 2024-11-04 PROCEDURE — 99213 OFFICE O/P EST LOW 20 MIN: CPT

## 2024-11-05 LAB
APPEARANCE: CLEAR
BACTERIA: NEGATIVE /HPF
BILIRUBIN URINE: NEGATIVE
BLOOD URINE: ABNORMAL
CAST: 0 /LPF
COLOR: YELLOW
EPITHELIAL CELLS: 0 /HPF
GLUCOSE QUALITATIVE U: NEGATIVE MG/DL
KETONES URINE: NEGATIVE MG/DL
LEUKOCYTE ESTERASE URINE: NEGATIVE
MICROSCOPIC-UA: NORMAL
NITRITE URINE: NEGATIVE
PH URINE: 6.5
PROTEIN URINE: NEGATIVE MG/DL
RED BLOOD CELLS URINE: 1 /HPF
REVIEW: NORMAL
SPECIFIC GRAVITY URINE: <1.005
UROBILINOGEN URINE: 0.2 MG/DL
WHITE BLOOD CELLS URINE: 0 /HPF

## 2024-11-06 LAB — BACTERIA UR CULT: NORMAL

## 2024-11-14 ENCOUNTER — APPOINTMENT (OUTPATIENT)
Dept: UROLOGY | Facility: CLINIC | Age: 69
End: 2024-11-14
Payer: MEDICARE

## 2024-11-14 ENCOUNTER — APPOINTMENT (OUTPATIENT)
Dept: CT IMAGING | Facility: CLINIC | Age: 69
End: 2024-11-14
Payer: MEDICARE

## 2024-11-14 VITALS
OXYGEN SATURATION: 98 % | HEIGHT: 65 IN | HEART RATE: 60 BPM | WEIGHT: 150 LBS | SYSTOLIC BLOOD PRESSURE: 125 MMHG | BODY MASS INDEX: 24.99 KG/M2 | DIASTOLIC BLOOD PRESSURE: 73 MMHG | TEMPERATURE: 97.2 F

## 2024-11-14 DIAGNOSIS — N20.0 CALCULUS OF KIDNEY: ICD-10-CM

## 2024-11-14 PROCEDURE — 99214 OFFICE O/P EST MOD 30 MIN: CPT

## 2024-11-14 PROCEDURE — 74178 CT ABD&PLV WO CNTR FLWD CNTR: CPT

## 2024-11-14 PROCEDURE — G2211 COMPLEX E/M VISIT ADD ON: CPT

## 2024-11-17 LAB
APPEARANCE: CLEAR
BACTERIA UR CULT: NORMAL
BACTERIA: NEGATIVE /HPF
BILIRUBIN URINE: NEGATIVE
BLOOD URINE: ABNORMAL
CAST: 0 /LPF
COLOR: YELLOW
EPITHELIAL CELLS: 0 /HPF
GLUCOSE QUALITATIVE U: NEGATIVE MG/DL
KETONES URINE: NEGATIVE MG/DL
LEUKOCYTE ESTERASE URINE: NEGATIVE
MICROSCOPIC-UA: NORMAL
NITRITE URINE: NEGATIVE
PH URINE: 6.5
PROTEIN URINE: NEGATIVE MG/DL
RED BLOOD CELLS URINE: 0 /HPF
SPECIFIC GRAVITY URINE: 1.02
UROBILINOGEN URINE: 0.2 MG/DL
WHITE BLOOD CELLS URINE: 0 /HPF

## 2024-11-27 ENCOUNTER — OUTPATIENT (OUTPATIENT)
Dept: OUTPATIENT SERVICES | Facility: HOSPITAL | Age: 69
LOS: 1 days | End: 2024-11-27
Payer: MEDICARE

## 2024-11-27 VITALS
HEART RATE: 60 BPM | OXYGEN SATURATION: 98 % | RESPIRATION RATE: 15 BRPM | SYSTOLIC BLOOD PRESSURE: 115 MMHG | DIASTOLIC BLOOD PRESSURE: 71 MMHG | TEMPERATURE: 98 F | HEIGHT: 65 IN | WEIGHT: 149.91 LBS

## 2024-11-27 DIAGNOSIS — N20.0 CALCULUS OF KIDNEY: ICD-10-CM

## 2024-11-27 DIAGNOSIS — G47.33 OBSTRUCTIVE SLEEP APNEA (ADULT) (PEDIATRIC): ICD-10-CM

## 2024-11-27 DIAGNOSIS — Z90.13 ACQUIRED ABSENCE OF BILATERAL BREASTS AND NIPPLES: Chronic | ICD-10-CM

## 2024-11-27 DIAGNOSIS — Z98.891 HISTORY OF UTERINE SCAR FROM PREVIOUS SURGERY: Chronic | ICD-10-CM

## 2024-11-27 DIAGNOSIS — Z98.890 OTHER SPECIFIED POSTPROCEDURAL STATES: Chronic | ICD-10-CM

## 2024-11-27 DIAGNOSIS — I10 ESSENTIAL (PRIMARY) HYPERTENSION: ICD-10-CM

## 2024-11-27 DIAGNOSIS — Z01.818 ENCOUNTER FOR OTHER PREPROCEDURAL EXAMINATION: ICD-10-CM

## 2024-11-27 LAB — BLD GP AB SCN SERPL QL: SIGNIFICANT CHANGE UP

## 2024-11-27 PROCEDURE — 86850 RBC ANTIBODY SCREEN: CPT

## 2024-11-27 PROCEDURE — 86900 BLOOD TYPING SEROLOGIC ABO: CPT

## 2024-11-27 PROCEDURE — 36415 COLL VENOUS BLD VENIPUNCTURE: CPT

## 2024-11-27 PROCEDURE — 86901 BLOOD TYPING SEROLOGIC RH(D): CPT

## 2024-11-27 PROCEDURE — G0463: CPT

## 2024-11-27 RX ORDER — SODIUM CHLORIDE 9 MG/ML
3 INJECTION, SOLUTION INTRAMUSCULAR; INTRAVENOUS; SUBCUTANEOUS EVERY 8 HOURS
Refills: 0 | Status: DISCONTINUED | OUTPATIENT
Start: 2024-12-17 | End: 2024-12-31

## 2024-11-27 NOTE — H&P PST ADULT - NSICDXPASTSURGICALHX_GEN_ALL_CORE_FT
PAST SURGICAL HISTORY:  History of bilateral mastectomy     History of breast surgery     History of  x 2

## 2024-11-27 NOTE — H&P PST ADULT - PROBLEM SELECTOR PLAN 3
no back pain, no gout, no musculoskeletal pain, no neck pain, and no weakness.
Dx RYAN 15 yrs ago, CPAP recommended - non compliant

## 2024-11-27 NOTE — H&P PST ADULT - NSICDXPASTMEDICALHX_GEN_ALL_CORE_FT
PAST MEDICAL HISTORY:  Depression, major     HLD (hyperlipidemia)     HTN (hypertension)     RYAN on CPAP Non compliant     PAST MEDICAL HISTORY:  Breast cancer     Depression, major     HLD (hyperlipidemia)     HTN (hypertension)     RYAN on CPAP Non compliant     PAST MEDICAL HISTORY:  Breast cancer     Calculus of kidney     Depression, major     HLD (hyperlipidemia)     HTN (hypertension)     RYAN on CPAP Non compliant

## 2024-11-27 NOTE — H&P PST ADULT - ASSESSMENT
69 y.o female with PMHx for Depression, HLD, HTN,  RYAN on CPAP Non compliant (15 yrs ago). now with c/o of hematuria and multiple UTI. On w/u found to have Calculus of Kidney and now for schedule Cystoscopy Right Ureteroscopy with Laser Lithotripsy Ureteral Stent Insertion and Retrograde Pyelogram on 12/3/24 with DR Bria Phillips RYAN 15 yrs ago, CPAP recommended - non compliant 69 y.o female with Calculus of Kidney and now for schedule Cystoscopy Right Ureteroscopy with Laser Lithotripsy Ureteral Stent Insertion and Retrograde Pyelogram on 12/3/24 with DR Fraser      Dx RYAN 15 yrs ago, CPAP recommended - non compliant

## 2024-11-27 NOTE — H&P PST ADULT - NSICDXPROCEDURE_GEN_ALL_CORE_FT
PROCEDURES:  Cystoscopy, with ureteroscopic laser lithotripsy and stent insertion 27-Nov-2024 09:42:14  Jerzy Ayoub

## 2024-11-27 NOTE — H&P PST ADULT - HISTORY OF PRESENT ILLNESS
69 y.o female with PMHx for Depression, HLD, HTN,  RYAN on CPAP Non compliant (15 yrs ago). now with c/o of hematuria and multiple UTI. On w/u found to have Calculus of Kidney and now for schedule Cystoscopy Right Ureteroscopy with Laser Lithotripsy Ureteral Stent Insertion and Retrograde Pyelogram on 12/3/24 with DR Fraser   69 y.o female with PMHx for Depression, HLD, HTN,  RYAN on CPAP Non compliant (15 yrs ago). Left breast cancer, s/p bilateral mastectomy and chemo 2022. now with c/o of hematuria and multiple UTI. On w/u found to have Calculus of Kidney and now for schedule Cystoscopy Right Ureteroscopy with Laser Lithotripsy Ureteral Stent Insertion and Retrograde Pyelogram on 12/3/24 with DR Fraser

## 2024-11-27 NOTE — H&P PST ADULT - NEGATIVE ENMT SYMPTOMS
[Mother] : mother [Father] : father no hearing difficulty/no ear pain/no sinus symptoms/no nasal congestion

## 2024-11-27 NOTE — H&P PST ADULT - PROBLEM SELECTOR PLAN 1
Pt schedule for  Cystoscopy Right Ureteroscopy with Laser Lithotripsy Ureteral Stent Insertion and Retrograde Pyelogram on 12/3/24 with DR Fraser        Labs drawn by PCP - will f/u result  Pt was seen by PCP for Medical clearance - will f/u report    Pt was  instructed to stop aspirin/ecotrin and all over the counter medication including vitamins and herbal supplements one week prior to surgery   Instructions given on the use of 4% chlorhexidine wash and Pt verbalized understanding of same   Pt Instructed to have nothing by mouth starting midnight day before surgery  Patient is to expect a phone call day before surgery between the hours of 430- 630pm giving arrival time for surgery   Written and verbal preoperative instructions given to patient with understanding verbalized.   Dx RYAN 15 yrs ago, CPAP recommended - non compliant Pt schedule for  Cystoscopy Right Ureteroscopy with Laser Lithotripsy Ureteral Stent Insertion and Retrograde Pyelogram on 12/3/24 with DR Fraser      Labs drawn by PCP - will f/u result  Pt was seen by PCP for Medical clearance - will f/u report    Pt was  instructed to stop aspirin/ecotrin and all over the counter medication including vitamins and herbal supplements one week prior to surgery   Instructions given on the use of 4% chlorhexidine wash and Pt verbalized understanding of same   Pt Instructed to have nothing by mouth starting midnight day before surgery  Patient is to expect a phone call day before surgery between the hours of 430- 630pm giving arrival time for surgery   Written and verbal preoperative instructions given to patient with understanding verbalized.   Dx RYAN 15 yrs ago, CPAP recommended - non compliant

## 2024-12-17 ENCOUNTER — APPOINTMENT (OUTPATIENT)
Dept: UROLOGY | Facility: HOSPITAL | Age: 69
End: 2024-12-17

## 2024-12-17 ENCOUNTER — OUTPATIENT (OUTPATIENT)
Dept: OUTPATIENT SERVICES | Facility: HOSPITAL | Age: 69
LOS: 1 days | End: 2024-12-17
Payer: MEDICARE

## 2024-12-17 ENCOUNTER — TRANSCRIPTION ENCOUNTER (OUTPATIENT)
Age: 69
End: 2024-12-17

## 2024-12-17 VITALS
RESPIRATION RATE: 16 BRPM | WEIGHT: 149.91 LBS | HEART RATE: 62 BPM | SYSTOLIC BLOOD PRESSURE: 133 MMHG | TEMPERATURE: 98 F | HEIGHT: 65 IN | OXYGEN SATURATION: 98 % | DIASTOLIC BLOOD PRESSURE: 73 MMHG

## 2024-12-17 VITALS
DIASTOLIC BLOOD PRESSURE: 68 MMHG | RESPIRATION RATE: 16 BRPM | HEART RATE: 64 BPM | OXYGEN SATURATION: 100 % | SYSTOLIC BLOOD PRESSURE: 130 MMHG | TEMPERATURE: 97 F

## 2024-12-17 DIAGNOSIS — Z98.890 OTHER SPECIFIED POSTPROCEDURAL STATES: Chronic | ICD-10-CM

## 2024-12-17 DIAGNOSIS — Z98.891 HISTORY OF UTERINE SCAR FROM PREVIOUS SURGERY: Chronic | ICD-10-CM

## 2024-12-17 DIAGNOSIS — Z90.13 ACQUIRED ABSENCE OF BILATERAL BREASTS AND NIPPLES: Chronic | ICD-10-CM

## 2024-12-17 DIAGNOSIS — N20.0 CALCULUS OF KIDNEY: ICD-10-CM

## 2024-12-17 DIAGNOSIS — Z01.818 ENCOUNTER FOR OTHER PREPROCEDURAL EXAMINATION: ICD-10-CM

## 2024-12-17 PROCEDURE — 82365 CALCULUS SPECTROSCOPY: CPT

## 2024-12-17 PROCEDURE — C1747: CPT

## 2024-12-17 PROCEDURE — 88300 SURGICAL PATH GROSS: CPT | Mod: 26

## 2024-12-17 PROCEDURE — 52356 CYSTO/URETERO W/LITHOTRIPSY: CPT | Mod: RT

## 2024-12-17 PROCEDURE — 76000 FLUOROSCOPY <1 HR PHYS/QHP: CPT

## 2024-12-17 PROCEDURE — C1889: CPT

## 2024-12-17 PROCEDURE — C1769: CPT

## 2024-12-17 PROCEDURE — 88300 SURGICAL PATH GROSS: CPT

## 2024-12-17 PROCEDURE — C2617: CPT

## 2024-12-17 DEVICE — GUIDEWIRE SENSOR DUAL-FLEX NITINOL ANGLED .038" X 150CM: Type: IMPLANTABLE DEVICE | Site: RIGHT | Status: FUNCTIONAL

## 2024-12-17 DEVICE — STENT URETHRAL PIGTL DBL 6FRX22CM: Type: IMPLANTABLE DEVICE | Site: RIGHT | Status: FUNCTIONAL

## 2024-12-17 DEVICE — GUIDEWIRE AMPLATZ SUPER-STIFF .035" X 145CM 3.5CM FLEXIBLE: Type: IMPLANTABLE DEVICE | Site: RIGHT | Status: FUNCTIONAL

## 2024-12-17 DEVICE — URETERAL SHEATH NAVIGATOR 11/13FR X 28CM: Type: IMPLANTABLE DEVICE | Site: RIGHT | Status: FUNCTIONAL

## 2024-12-17 DEVICE — URETEROSCOPE LITHOVUE DISP: Type: IMPLANTABLE DEVICE | Site: RIGHT | Status: FUNCTIONAL

## 2024-12-17 DEVICE — STONE BASKET ZEROTIP NITINOL 4-WIRE 1.9FR 120CM X 12MM: Type: IMPLANTABLE DEVICE | Site: RIGHT | Status: FUNCTIONAL

## 2024-12-17 DEVICE — URETERAL STENT PERCUFLEX PLUS 6FR 24CM: Type: IMPLANTABLE DEVICE | Site: RIGHT | Status: FUNCTIONAL

## 2024-12-17 RX ORDER — ONDANSETRON HYDROCHLORIDE 4 MG/1
4 TABLET, FILM COATED ORAL ONCE
Refills: 0 | Status: DISCONTINUED | OUTPATIENT
Start: 2024-12-17 | End: 2024-12-17

## 2024-12-17 RX ORDER — 0.9 % SODIUM CHLORIDE 0.9 %
1000 INTRAVENOUS SOLUTION INTRAVENOUS
Refills: 0 | Status: DISCONTINUED | OUTPATIENT
Start: 2024-12-17 | End: 2024-12-17

## 2024-12-17 RX ORDER — OXYBUTYNIN CHLORIDE 5 MG
1 TABLET ORAL
Qty: 21 | Refills: 0
Start: 2024-12-17 | End: 2024-12-23

## 2024-12-17 RX ORDER — OXYCODONE HYDROCHLORIDE 30 MG/1
5 TABLET ORAL ONCE
Refills: 0 | Status: DISCONTINUED | OUTPATIENT
Start: 2024-12-17 | End: 2024-12-17

## 2024-12-17 RX ORDER — HYDROMORPHONE HYDROCHLORIDE 2 MG/1
0.5 TABLET ORAL
Refills: 0 | Status: DISCONTINUED | OUTPATIENT
Start: 2024-12-17 | End: 2024-12-17

## 2024-12-17 RX ORDER — TAMSULOSIN HYDROCHLORIDE 0.4 MG/1
1 CAPSULE ORAL
Qty: 10 | Refills: 0
Start: 2024-12-17 | End: 2024-12-26

## 2024-12-17 RX ORDER — CEPHALEXIN 500 MG
1 CAPSULE ORAL
Qty: 6 | Refills: 0
Start: 2024-12-17 | End: 2024-12-19

## 2024-12-17 NOTE — ASU DISCHARGE PLAN (ADULT/PEDIATRIC) - ASU DC SPECIAL INSTRUCTIONSFT
It is common to have blood in the urine after your procedure.  It may be pink or even red; inform your doctor if you have a significant amount of clots in the urine or if you are unable to void at all.  Be sure to drink plenty of fluids at all times.    -It is not uncommon to have some burning when you urinate or to even notice some stone fragments in your urine.  -You have an internal stent (a hollow tube that runs from the kidney to your bladder) after your procedure, helping your kidney drain down to your bladder after your surgery.  Some patients do not notice that they have a stent, while others complain of the sensation of needing to urinate frequently, burning on urination, or even some back pain (especially when they go to urinate). These sensations usually improve gradually, some faster than others. This is not uncommon, but may initially warrant the use of the pain medication which you were prescribed.  While the stent is in place, your urine may continue to be bloody. This stent is temporary and must be removed by your urologist as an outpatient.  -Provided that you are not restricted with fluids by your physician, you should drink 6-8 (8 oz.) glasses of fluid per day.  -You may resume your regular diet and regular medication regimen.    -You may shower or bathe.    -You may take over the counter pain medications such as Motrin and Tylenol as needed for pain.  Do not exceed 4 grams of Tylenol daily.  Each medication may be taken every 6 hours.  You may alternate these medications such that you take either one every 3 hours.  If you have severe pain that does not improve with the pain medication or you have persistent vomiting, call your doctor.  -You will have a prescription for an antibiotic when you go home.  Take this medication as instructed; if you miss one dose, resume taking it on your previous schedule until you have completed the entire course of treatment.  -As you have just underwent general anesthesia, you should refrain from driving, heavy lifting, smoking, alcohol consumption, or important decision making for the next 24 hours.  You may climb stairs and you may resume sexual activity.  -Call your physician if you have a fever over 101F.  -Make a follow up appointment for with your urologist when you arrive home (or the next business day).  -Call your urologist during normal business hours with any other routine questions.  -Dr. Fraser's office will reach out to make an appt for stent removal next week

## 2024-12-17 NOTE — ASU PATIENT PROFILE, ADULT - ANESTHESIA, PREVIOUS REACTION, PROFILE
Problem: Pain:  Intervention: Opioid analgesia side-effects  See [place]  Intervention: Assess barriers to pain control  See Flowsheet  Intervention: Promote participation in pain management plan  See Flowsheet    Goal: Pain level will decrease  Pain level will decrease   Outcome: Ongoing  See Flowsheet  Goal: Control of acute pain  Control of acute pain   Outcome: Ongoing  See Flowsheet  Goal: Control of chronic pain  Control of chronic pain   Outcome: Ongoing  See Jayro    Problem: Wound:  Intervention: Assess pain status  See Flowsheet  Intervention: Assess wound size, appearance and drainage  See Flowsheet  Intervention: Assess pedal pulses bilaterally if patient has a foot or leg ulcer  See Flowsheet  Intervention: Doppler if unable to palpate pedal pulse  See Flowsheet    Goal: Will show signs of wound healing; wound closure and no evidence of infection  Will show signs of wound healing; wound closure and no evidence of infection   Outcome: Ongoing  See [facility] none

## 2024-12-17 NOTE — ASU PATIENT PROFILE, ADULT - REASON FOR ADMISSION, PROFILE
Cystoscopy Right Ureteroscopy with Laser Lithotripsy Ureteral Stent Insertion and Retrograde Pyelogram

## 2024-12-17 NOTE — ASU DISCHARGE PLAN (ADULT/PEDIATRIC) - FINANCIAL ASSISTANCE
[facility] provides services at a reduced cost to those who are determined to be eligible through [facility]’s financial assistance program. Information regarding [facility]’s financial assistance program can be found by going to [email or web address] or by calling [phone].

## 2024-12-17 NOTE — BRIEF OPERATIVE NOTE - NSICDXBRIEFPROCEDURE_GEN_ALL_CORE_FT
PROCEDURES:  Cystoscopy, w retro pyelogram, w any combo ureteroscopy, brian YAG laser lithotrip, calculus basket manip, + ureteral stent insert if indicat 17-Dec-2024 14:22:08  Henrique Jha

## 2024-12-17 NOTE — BRIEF OPERATIVE NOTE - OPERATION/FINDINGS
Cystoscopy, right ureteroscopy, laser lithotripsy, and ureteral stent placement. 6x24cm ureteral stent no string. No randall

## 2024-12-17 NOTE — ASU PATIENT PROFILE, ADULT - DOES PATIENT HAVE ADVANCE DIRECTIVE
Reminder letter to schedule repeat colonoscopy mailed.   Records on file,  Last colonoscopy: 8/20/08  Reason: screening, 10 yr follow up.     will discuss with my daughter Ruby - /No

## 2024-12-17 NOTE — ASU DISCHARGE PLAN (ADULT/PEDIATRIC) - NS MD DC FALL RISK RISK
For information on Fall & Injury Prevention, visit: [email or web address] OR  [email or web address] OR  https://www.cdc.gov/steadi/patient.html

## 2024-12-17 NOTE — ASU PREOP CHECKLIST - TEMPERATURE IN FAHRENHEIT (DEGREES F)
Zoryve Counseling:  I discussed with the patient that Zoryve is not for use in the eyes, mouth or vagina. The most commonly reported side effects include diarrhea, headache, insomnia, application site pain, upper respiratory tract infections, and urinary tract infections.  All of the patient's questions and concerns were addressed. 98.1

## 2024-12-17 NOTE — ASU PATIENT PROFILE, ADULT - HEALTHCARE INFORMATION NEEDED, PROFILE
Discuss pre/postoperative plan of care Discuss pre/postoperative plan of care; no blood work or blood-pressure taken to the left arm due to s/p mastectomy; left arm precaution bracelet applied to the left wrist.

## 2024-12-17 NOTE — ASU PATIENT PROFILE, ADULT - NSICDXPASTMEDICALHX_GEN_ALL_CORE_FT
PAST MEDICAL HISTORY:  Breast cancer     Calculus of kidney     Depression, major     HLD (hyperlipidemia)     HTN (hypertension)     RYAN on CPAP Non compliant

## 2024-12-19 PROBLEM — C50.919 MALIGNANT NEOPLASM OF UNSPECIFIED SITE OF UNSPECIFIED FEMALE BREAST: Chronic | Status: ACTIVE | Noted: 2024-11-27

## 2024-12-19 PROBLEM — N20.0 CALCULUS OF KIDNEY: Chronic | Status: ACTIVE | Noted: 2024-11-27

## 2024-12-24 ENCOUNTER — APPOINTMENT (OUTPATIENT)
Dept: ULTRASOUND IMAGING | Facility: CLINIC | Age: 69
End: 2024-12-24
Payer: MEDICARE

## 2024-12-24 ENCOUNTER — RESULT REVIEW (OUTPATIENT)
Age: 69
End: 2024-12-24

## 2024-12-24 PROCEDURE — 76641 ULTRASOUND BREAST COMPLETE: CPT | Mod: LT

## 2024-12-27 ENCOUNTER — APPOINTMENT (OUTPATIENT)
Dept: UROLOGY | Facility: CLINIC | Age: 69
End: 2024-12-27
Payer: MEDICARE

## 2024-12-27 VITALS
RESPIRATION RATE: 16 BRPM | DIASTOLIC BLOOD PRESSURE: 65 MMHG | OXYGEN SATURATION: 98 % | HEART RATE: 67 BPM | TEMPERATURE: 96.8 F | BODY MASS INDEX: 24.99 KG/M2 | WEIGHT: 150 LBS | HEIGHT: 65 IN | SYSTOLIC BLOOD PRESSURE: 114 MMHG

## 2024-12-27 DIAGNOSIS — N20.0 CALCULUS OF KIDNEY: ICD-10-CM

## 2024-12-27 LAB
CELL MATERIAL STONE EST-MCNT: SIGNIFICANT CHANGE UP
LABORATORY COMMENT REPORT: SIGNIFICANT CHANGE UP
NIDUS STONE QN: SIGNIFICANT CHANGE UP

## 2024-12-27 PROCEDURE — 52310 CYSTOSCOPY AND TREATMENT: CPT

## 2024-12-31 ENCOUNTER — APPOINTMENT (OUTPATIENT)
Dept: ULTRASOUND IMAGING | Facility: CLINIC | Age: 69
End: 2024-12-31

## 2025-01-06 ENCOUNTER — APPOINTMENT (OUTPATIENT)
Dept: SURGERY | Facility: CLINIC | Age: 70
End: 2025-01-06
Payer: MEDICARE

## 2025-01-06 ENCOUNTER — NON-APPOINTMENT (OUTPATIENT)
Age: 70
End: 2025-01-06

## 2025-01-06 VITALS
BODY MASS INDEX: 25.49 KG/M2 | HEART RATE: 58 BPM | OXYGEN SATURATION: 99 % | WEIGHT: 153 LBS | DIASTOLIC BLOOD PRESSURE: 61 MMHG | HEIGHT: 65 IN | SYSTOLIC BLOOD PRESSURE: 134 MMHG

## 2025-01-06 DIAGNOSIS — C50.912 MALIGNANT NEOPLASM OF UNSPECIFIED SITE OF LEFT FEMALE BREAST: ICD-10-CM

## 2025-01-06 PROCEDURE — 99213 OFFICE O/P EST LOW 20 MIN: CPT

## 2025-01-15 ENCOUNTER — OUTPATIENT (OUTPATIENT)
Dept: OUTPATIENT SERVICES | Facility: HOSPITAL | Age: 70
LOS: 1 days | End: 2025-01-15
Payer: MEDICARE

## 2025-01-15 VITALS
SYSTOLIC BLOOD PRESSURE: 134 MMHG | OXYGEN SATURATION: 98 % | TEMPERATURE: 98 F | WEIGHT: 149.91 LBS | DIASTOLIC BLOOD PRESSURE: 74 MMHG | RESPIRATION RATE: 16 BRPM | HEIGHT: 65 IN | HEART RATE: 58 BPM

## 2025-01-15 DIAGNOSIS — Z29.9 ENCOUNTER FOR PROPHYLACTIC MEASURES, UNSPECIFIED: ICD-10-CM

## 2025-01-15 DIAGNOSIS — Z01.818 ENCOUNTER FOR OTHER PREPROCEDURAL EXAMINATION: ICD-10-CM

## 2025-01-15 DIAGNOSIS — Z98.891 HISTORY OF UTERINE SCAR FROM PREVIOUS SURGERY: Chronic | ICD-10-CM

## 2025-01-15 DIAGNOSIS — Z98.890 OTHER SPECIFIED POSTPROCEDURAL STATES: Chronic | ICD-10-CM

## 2025-01-15 DIAGNOSIS — I10 ESSENTIAL (PRIMARY) HYPERTENSION: ICD-10-CM

## 2025-01-15 DIAGNOSIS — C50.912 MALIGNANT NEOPLASM OF UNSPECIFIED SITE OF LEFT FEMALE BREAST: ICD-10-CM

## 2025-01-15 DIAGNOSIS — Z90.13 ACQUIRED ABSENCE OF BILATERAL BREASTS AND NIPPLES: Chronic | ICD-10-CM

## 2025-01-15 DIAGNOSIS — E78.5 HYPERLIPIDEMIA, UNSPECIFIED: ICD-10-CM

## 2025-01-15 DIAGNOSIS — F32.A DEPRESSION, UNSPECIFIED: ICD-10-CM

## 2025-01-15 NOTE — H&P PST ADULT - NSANTHOSAYNRD_GEN_A_CORE
H/O RYAN, Has not used CPAP for approximately 10 years/No. RYAN screening performed.  STOP BANG Legend: 0-2 = LOW Risk; 3-4 = INTERMEDIATE Risk; 5-8 = HIGH Risk

## 2025-01-15 NOTE — H&P PST ADULT - NSICDXFAMILYHX_GEN_ALL_CORE_FT
FAMILY HISTORY:  Mother  Still living? No  FH: hypertension, Age at diagnosis: Age Unknown    Sibling  Still living? Yes, Estimated age: Age Unknown  FH: type 2 diabetes, Age at diagnosis: Age Unknown

## 2025-01-15 NOTE — H&P PST ADULT - HISTORY OF PRESENT ILLNESS
68 yo female with history of HTN, HLD, Breast Cancer (completed chemotherapy), moderate RYAN (has not used CPAP for approximately 10 years), Depression, reports the above.  Patient states already has bilateral mastectomy, now has two masses on the left side of the chest.  She is scheduled for : Excision of Left Breast Max 2, on 1/21/25

## 2025-01-16 PROCEDURE — G0463: CPT

## 2025-01-31 ENCOUNTER — OUTPATIENT (OUTPATIENT)
Dept: OUTPATIENT SERVICES | Facility: HOSPITAL | Age: 70
LOS: 1 days | End: 2025-01-31
Payer: MEDICARE

## 2025-01-31 ENCOUNTER — APPOINTMENT (OUTPATIENT)
Dept: SURGERY | Facility: HOSPITAL | Age: 70
End: 2025-01-31

## 2025-01-31 ENCOUNTER — TRANSCRIPTION ENCOUNTER (OUTPATIENT)
Age: 70
End: 2025-01-31

## 2025-01-31 VITALS
HEART RATE: 63 BPM | SYSTOLIC BLOOD PRESSURE: 114 MMHG | OXYGEN SATURATION: 100 % | DIASTOLIC BLOOD PRESSURE: 52 MMHG | RESPIRATION RATE: 16 BRPM | TEMPERATURE: 98 F

## 2025-01-31 VITALS
RESPIRATION RATE: 16 BRPM | SYSTOLIC BLOOD PRESSURE: 122 MMHG | HEART RATE: 59 BPM | OXYGEN SATURATION: 100 % | DIASTOLIC BLOOD PRESSURE: 68 MMHG | TEMPERATURE: 98 F | HEIGHT: 65 IN | WEIGHT: 149.91 LBS

## 2025-01-31 DIAGNOSIS — Z01.818 ENCOUNTER FOR OTHER PREPROCEDURAL EXAMINATION: ICD-10-CM

## 2025-01-31 DIAGNOSIS — Z98.890 OTHER SPECIFIED POSTPROCEDURAL STATES: Chronic | ICD-10-CM

## 2025-01-31 DIAGNOSIS — Z90.13 ACQUIRED ABSENCE OF BILATERAL BREASTS AND NIPPLES: Chronic | ICD-10-CM

## 2025-01-31 DIAGNOSIS — C50.912 MALIGNANT NEOPLASM OF UNSPECIFIED SITE OF LEFT FEMALE BREAST: ICD-10-CM

## 2025-01-31 DIAGNOSIS — Z98.891 HISTORY OF UTERINE SCAR FROM PREVIOUS SURGERY: Chronic | ICD-10-CM

## 2025-01-31 PROCEDURE — 88305 TISSUE EXAM BY PATHOLOGIST: CPT

## 2025-01-31 PROCEDURE — 19120 REMOVAL OF BREAST LESION: CPT | Mod: LT

## 2025-01-31 PROCEDURE — 88305 TISSUE EXAM BY PATHOLOGIST: CPT | Mod: 26

## 2025-01-31 RX ORDER — BACTERIOSTATIC SODIUM CHLORIDE 0.9 %
3 VIAL (ML) INJECTION EVERY 8 HOURS
Refills: 0 | Status: ACTIVE | OUTPATIENT
Start: 2025-01-31 | End: 2025-12-30

## 2025-01-31 RX ORDER — FENTANYL CITRATE 50 UG/ML
25 INJECTION INTRAMUSCULAR; INTRAVENOUS
Refills: 0 | Status: DISCONTINUED | OUTPATIENT
Start: 2025-01-31 | End: 2025-01-31

## 2025-01-31 RX ORDER — SODIUM CHLORIDE 9 G/ML
1000 INJECTION, SOLUTION INTRAVENOUS
Refills: 0 | Status: DISCONTINUED | OUTPATIENT
Start: 2025-01-31 | End: 2025-01-31

## 2025-01-31 RX ORDER — B COMPLEX, C NO.20/FOLIC ACID 1 MG
1 CAPSULE ORAL
Refills: 0 | DISCHARGE

## 2025-01-31 RX ORDER — ONDANSETRON 4 MG/1
4 TABLET, ORALLY DISINTEGRATING ORAL ONCE
Refills: 0 | Status: DISCONTINUED | OUTPATIENT
Start: 2025-01-31 | End: 2025-01-31

## 2025-01-31 NOTE — ASU DISCHARGE PLAN (ADULT/PEDIATRIC) - NS MD DC FALL RISK RISK
For information on Fall & Injury Prevention, visit: [email or web address] OR  [email or web address] OR  https://www.cdc.gov/steadi/patient.html

## 2025-01-31 NOTE — ASU DISCHARGE PLAN (ADULT/PEDIATRIC) - FINANCIAL ASSISTANCE
[facility] provides services at a reduced cost to those who are determined to be eligible through [facility]’s financial assistance program. Information regarding [facility]’s financial assistance program can be found by going to [email or web address] or by calling [phone].

## 2025-01-31 NOTE — ASU DISCHARGE PLAN (ADULT/PEDIATRIC) - CARE PROVIDER_API CALL
Aly Ribeiro  Surgery  [address]  [place] [address]  Phone: [phone]  Fax: [phone]  Follow Up Time: 2 weeks

## 2025-02-07 ENCOUNTER — APPOINTMENT (OUTPATIENT)
Dept: UROLOGY | Facility: CLINIC | Age: 70
End: 2025-02-07
Payer: MEDICARE

## 2025-02-07 VITALS
HEART RATE: 69 BPM | HEIGHT: 65 IN | TEMPERATURE: 97 F | BODY MASS INDEX: 25.49 KG/M2 | OXYGEN SATURATION: 98 % | WEIGHT: 153 LBS | SYSTOLIC BLOOD PRESSURE: 105 MMHG | DIASTOLIC BLOOD PRESSURE: 67 MMHG

## 2025-02-07 DIAGNOSIS — N20.0 CALCULUS OF KIDNEY: ICD-10-CM

## 2025-02-07 PROCEDURE — 76775 US EXAM ABDO BACK WALL LIM: CPT

## 2025-02-07 PROCEDURE — G2211 COMPLEX E/M VISIT ADD ON: CPT

## 2025-02-07 PROCEDURE — 99213 OFFICE O/P EST LOW 20 MIN: CPT

## 2025-02-10 ENCOUNTER — APPOINTMENT (OUTPATIENT)
Dept: SURGERY | Facility: CLINIC | Age: 70
End: 2025-02-10
Payer: MEDICARE

## 2025-02-10 DIAGNOSIS — C50.912 MALIGNANT NEOPLASM OF UNSPECIFIED SITE OF LEFT FEMALE BREAST: ICD-10-CM

## 2025-02-10 LAB — SURGICAL PATHOLOGY STUDY: SIGNIFICANT CHANGE UP

## 2025-02-10 PROCEDURE — 99024 POSTOP FOLLOW-UP VISIT: CPT

## 2025-02-12 ENCOUNTER — NON-APPOINTMENT (OUTPATIENT)
Age: 70
End: 2025-02-12

## 2025-05-13 NOTE — ED ADULT NURSE NOTE - CHPI ED NUR DURATION
Diagnosis - AVR  Goal -2.0-3.0  Dr. Anna is referring MD - referral expires 3/6/26.    Patient came to clinic for anticoagulation monitoring.  Last INR on 5/6 was 2.5.  Dose maintained.   Today's INR is 2.4 and is within goal range.    Current warfarin total weekly dose of 42 mg verified.  Informed the INR result is within therapeutic range and instructed to maintain current dose per protocol. Discussed dose and return date of 5/27 for next INR. See Anticoagulation flowsheet.    Dr. Sethi is in the office today supervising the treatment.    Instructed to contact the clinic with any unusual bleeding or bruising, any changes in medications, diet, health status, lifestyle, or any other changes, questions or concerns. Verbalized understanding of all discussed.     
today

## 2025-06-23 ENCOUNTER — APPOINTMENT (OUTPATIENT)
Dept: UROLOGY | Facility: CLINIC | Age: 70
End: 2025-06-23
Payer: MEDICARE

## 2025-06-23 VITALS
HEART RATE: 67 BPM | DIASTOLIC BLOOD PRESSURE: 67 MMHG | SYSTOLIC BLOOD PRESSURE: 121 MMHG | OXYGEN SATURATION: 98 % | TEMPERATURE: 97 F

## 2025-06-23 PROCEDURE — 99213 OFFICE O/P EST LOW 20 MIN: CPT

## 2025-06-23 PROCEDURE — G2211 COMPLEX E/M VISIT ADD ON: CPT

## 2025-06-23 PROCEDURE — 76775 US EXAM ABDO BACK WALL LIM: CPT

## 2025-07-09 NOTE — DATA REVIEWED
[No studies available for review at this time.] : No studies available for review at this time. patient

## (undated) DEVICE — FOR-ESU VALLEYLAB T7E14848DX: Type: DURABLE MEDICAL EQUIPMENT

## (undated) DEVICE — VENODYNE/SCD SLEEVE CALF MEDIUM

## (undated) DEVICE — FOLEY HOLDER STATLOCK 2 WAY ADULT

## (undated) DEVICE — ELCTR PENCIL NEPTUNE SMOKE EVACUATION

## (undated) DEVICE — TUBING THERMADX UROLOGY

## (undated) DEVICE — POSITIONER HEAD REST PRONE

## (undated) DEVICE — GLV 7 PROTEXIS (WHITE)

## (undated) DEVICE — DRAPE C ARM UNIVERSAL

## (undated) DEVICE — BLANKET WARMER LOWER ADULT

## (undated) DEVICE — DRSG TRACH DRAINAGE 4X4

## (undated) DEVICE — ELCTR GROUNDING PAD ADULT COVIDIEN

## (undated) DEVICE — SUT POLYSORB 3-0 30" V-20 UNDYED

## (undated) DEVICE — SYR ASEPTO

## (undated) DEVICE — DRSG MASTISOL

## (undated) DEVICE — DRAIN BLAKE #19

## (undated) DEVICE — GLV 8 PROTEXIS (WHITE)

## (undated) DEVICE — SOL IRR POUR H2O 1500ML

## (undated) DEVICE — SOL IRR POUR H2O 500ML

## (undated) DEVICE — GLV 6.5 PROTEXIS (WHITE)

## (undated) DEVICE — PACK CYSTO

## (undated) DEVICE — DRAPE LIGHT HANDLE COVER BLUE

## (undated) DEVICE — SUT POLYSORB 4-0 27" P-12 UNDYED

## (undated) DEVICE — DRSG 4X4

## (undated) DEVICE — PACK MINOR NO DRAPE

## (undated) DEVICE — PACK MAJOR ABDOMINAL WITH LAP

## (undated) DEVICE — SUT SOFSILK 2-0 18" C-15

## (undated) DEVICE — DRAPE LAPAROTOMY TRANSVERSE

## (undated) DEVICE — FOLEY TRAY 16FR 5CC LTX UMETER CLOSED

## (undated) DEVICE — PACKING GAUZE PLAIN 2"

## (undated) DEVICE — SUT SOFSILK 2-0 30" V-20

## (undated) DEVICE — DRSG KERLIX LG 7-3/4" X 8-3/4"

## (undated) DEVICE — DRSG MAMMARY SUPPORT MED SIZE 3

## (undated) DEVICE — ACMI SELF-SEALING SEAL UP TO 7FR

## (undated) DEVICE — NDL HYPO SAFE 25G X 1.5" (ORANGE)

## (undated) DEVICE — WRAP COMPRESSION CALF MED

## (undated) DEVICE — TUBING RANGER FLUID IRRIGATION SET DISP

## (undated) DEVICE — WARMING BLANKET UPPER ADULT

## (undated) DEVICE — DRAPE EQUIPMENT BANDED BAG 30 X 30" (SHOWER CAP)

## (undated) DEVICE — SOL IRR POUR NS 0.9% 500ML

## (undated) DEVICE — TUBING TUR 2 PRONG

## (undated) DEVICE — GLV 7.5 PROTEXIS (WHITE)

## (undated) DEVICE — SOL IRR BAG NS 0.9% 3000ML

## (undated) DEVICE — SUT POLYSORB 2-0 18" TIES UNDYED

## (undated) DEVICE — SOL IRR BAG H2O 3000ML

## (undated) DEVICE — IRR BULB PATHFINDER + 10"

## (undated) DEVICE — DRSG MAMMARY SUPPORT XL SIZE 5

## (undated) DEVICE — GLV 7 PROTEXIS

## (undated) DEVICE — BLANKET WARMER FULL ADULT

## (undated) DEVICE — DRAPE ULTRASOUND PROBE COVER TELESCOPE  5X72"

## (undated) DEVICE — GOWN XL

## (undated) DEVICE — DRAPE LIGHT HANDLE COVER (BLUE)

## (undated) DEVICE — SUT POLYSORB 2-0 36" GS-21 UNDYED

## (undated) DEVICE — DRAIN JACKSON PRATT 10MM FLAT FULL NO TROCAR

## (undated) DEVICE — GLV 7.5 PROTEXIS

## (undated) DEVICE — DRSG TEGADERM 4 X 4.75"

## (undated) DEVICE — DRAIN RESERVOIR FOR JACKSON PRATT 100CC CARDINAL

## (undated) DEVICE — POSITIONER FOAM EGG CRATE ULNAR 2PCS (PINK)

## (undated) DEVICE — DRSG STERISTRIPS 0.5X4"

## (undated) DEVICE — DRAPE 1/2 SHEET 40X57"

## (undated) DEVICE — WARMING BLANKET LOWER ADULT

## (undated) DEVICE — VENODYNE/SCD SLEEVE CALF LARGE

## (undated) DEVICE — DRAPE HALF SHEET 40X57"

## (undated) DEVICE — DRSG MAMMARY SUPPORT LG SIZE 4

## (undated) DEVICE — DRSG CURITY GAUZE SPONGE 4 X 4" 12-PLY